# Patient Record
Sex: FEMALE | Race: WHITE | Employment: FULL TIME | URBAN - NONMETROPOLITAN AREA
[De-identification: names, ages, dates, MRNs, and addresses within clinical notes are randomized per-mention and may not be internally consistent; named-entity substitution may affect disease eponyms.]

---

## 2017-01-16 ENCOUNTER — OFFICE VISIT (OUTPATIENT)
Dept: PRIMARY CARE CLINIC | Age: 42
End: 2017-01-16
Payer: COMMERCIAL

## 2017-01-16 VITALS
OXYGEN SATURATION: 99 % | HEART RATE: 71 BPM | DIASTOLIC BLOOD PRESSURE: 70 MMHG | BODY MASS INDEX: 27.17 KG/M2 | HEIGHT: 60 IN | RESPIRATION RATE: 18 BRPM | WEIGHT: 138.4 LBS | TEMPERATURE: 98.3 F | SYSTOLIC BLOOD PRESSURE: 126 MMHG

## 2017-01-16 DIAGNOSIS — F41.9 ANXIETY AND DEPRESSION: ICD-10-CM

## 2017-01-16 DIAGNOSIS — Z71.3 ENCOUNTER FOR WEIGHT LOSS COUNSELING: ICD-10-CM

## 2017-01-16 DIAGNOSIS — Z11.4 SCREENING FOR HIV WITHOUT PRESENCE OF RISK FACTORS: ICD-10-CM

## 2017-01-16 DIAGNOSIS — F11.20 NARCOTIC DEPENDENCE (HCC): ICD-10-CM

## 2017-01-16 DIAGNOSIS — F32.A ANXIETY AND DEPRESSION: ICD-10-CM

## 2017-01-16 LAB
AMPHETAMINE SCREEN, URINE: NORMAL
BARBITURATE SCREEN, URINE: NORMAL
BENZODIAZEPINE SCREEN, URINE: NORMAL
COCAINE METABOLITE SCREEN URINE: NORMAL
MDMA URINE: NORMAL
METHADONE SCREEN, URINE: NORMAL
METHAMPHETAMINE, URINE: NORMAL
OPIATE SCREEN URINE: NORMAL
OXYCODONE SCREEN URINE: NORMAL
PHENCYCLIDINE SCREEN URINE: NORMAL
PROPOXYPHENE SCREEN, URINE: NORMAL
THC: NORMAL
TRICYCLIC ANTIDEPRESSANTS, UR: NORMAL

## 2017-01-16 PROCEDURE — 80305 DRUG TEST PRSMV DIR OPT OBS: CPT | Performed by: NURSE PRACTITIONER

## 2017-01-16 PROCEDURE — 99214 OFFICE O/P EST MOD 30 MIN: CPT | Performed by: NURSE PRACTITIONER

## 2017-01-16 RX ORDER — DESVENLAFAXINE 50 MG/1
100 TABLET, EXTENDED RELEASE ORAL DAILY
Qty: 90 TABLET | Refills: 2 | Status: SHIPPED | OUTPATIENT
Start: 2017-01-16 | End: 2017-04-24 | Stop reason: SDUPTHER

## 2017-01-16 RX ORDER — TRAZODONE HYDROCHLORIDE 50 MG/1
50 TABLET ORAL NIGHTLY
Qty: 90 TABLET | Refills: 0 | Status: SHIPPED | OUTPATIENT
Start: 2017-01-16 | End: 2017-04-24 | Stop reason: SDUPTHER

## 2017-01-16 RX ORDER — PHENTERMINE HYDROCHLORIDE 37.5 MG/1
TABLET ORAL
Qty: 30 TABLET | Refills: 0 | Status: SHIPPED | OUTPATIENT
Start: 2017-01-16 | End: 2017-04-26 | Stop reason: SDUPTHER

## 2017-01-16 ASSESSMENT — ENCOUNTER SYMPTOMS: RESPIRATORY NEGATIVE: 1

## 2017-01-17 DIAGNOSIS — Z11.4 SCREENING FOR HIV WITHOUT PRESENCE OF RISK FACTORS: ICD-10-CM

## 2017-01-17 LAB — RAPID HIV 1&2: NORMAL

## 2017-01-19 ENCOUNTER — TELEPHONE (OUTPATIENT)
Dept: PRIMARY CARE CLINIC | Age: 42
End: 2017-01-19

## 2017-03-28 ENCOUNTER — EMPLOYEE WELLNESS (OUTPATIENT)
Dept: OTHER | Age: 42
End: 2017-03-28

## 2017-03-28 LAB
CHOLESTEROL, TOTAL: 133 MG/DL (ref 160–199)
GLUCOSE BLD-MCNC: 87 MG/DL (ref 74–109)
HDLC SERPL-MCNC: 46 MG/DL (ref 65–121)
LDL CHOLESTEROL CALCULATED: 73 MG/DL
TRIGL SERPL-MCNC: 72 MG/DL (ref 150–199)

## 2017-04-03 ENCOUNTER — TELEPHONE (OUTPATIENT)
Dept: PRIMARY CARE CLINIC | Age: 42
End: 2017-04-03

## 2017-04-24 RX ORDER — DESVENLAFAXINE 50 MG/1
100 TABLET, EXTENDED RELEASE ORAL DAILY
Qty: 60 TABLET | Refills: 11 | Status: SHIPPED | OUTPATIENT
Start: 2017-04-24 | End: 2018-01-11 | Stop reason: SDUPTHER

## 2017-04-24 RX ORDER — TRAZODONE HYDROCHLORIDE 50 MG/1
50 TABLET ORAL NIGHTLY
Qty: 90 TABLET | Refills: 3 | Status: SHIPPED | OUTPATIENT
Start: 2017-04-24 | End: 2018-05-30 | Stop reason: SDUPTHER

## 2017-04-26 ENCOUNTER — TELEPHONE (OUTPATIENT)
Dept: PRIMARY CARE CLINIC | Age: 42
End: 2017-04-26

## 2017-04-26 DIAGNOSIS — Z76.0 MEDICATION REFILL: Primary | ICD-10-CM

## 2017-04-26 DIAGNOSIS — Z76.0 MEDICATION REFILL: ICD-10-CM

## 2017-04-26 DIAGNOSIS — Z71.3 ENCOUNTER FOR WEIGHT LOSS COUNSELING: Primary | ICD-10-CM

## 2017-04-26 RX ORDER — PHENTERMINE HYDROCHLORIDE 37.5 MG/1
TABLET ORAL
Qty: 30 TABLET | Refills: 0 | Status: SHIPPED | OUTPATIENT
Start: 2017-04-26 | End: 2017-04-26 | Stop reason: CLARIF

## 2017-05-22 ENCOUNTER — PATIENT MESSAGE (OUTPATIENT)
Dept: PRIMARY CARE CLINIC | Age: 42
End: 2017-05-22

## 2017-05-23 RX ORDER — PHENTERMINE HYDROCHLORIDE 37.5 MG/1
TABLET ORAL
Qty: 30 TABLET | Refills: 0 | OUTPATIENT
Start: 2017-05-23 | End: 2017-06-02 | Stop reason: SDUPTHER

## 2017-06-02 ENCOUNTER — OFFICE VISIT (OUTPATIENT)
Dept: PRIMARY CARE CLINIC | Age: 42
End: 2017-06-02
Payer: COMMERCIAL

## 2017-06-02 VITALS
WEIGHT: 135.4 LBS | DIASTOLIC BLOOD PRESSURE: 82 MMHG | SYSTOLIC BLOOD PRESSURE: 120 MMHG | OXYGEN SATURATION: 92 % | HEART RATE: 71 BPM | HEIGHT: 60 IN | BODY MASS INDEX: 26.58 KG/M2 | TEMPERATURE: 97.4 F

## 2017-06-02 DIAGNOSIS — Z71.3 ENCOUNTER FOR WEIGHT LOSS COUNSELING: Primary | ICD-10-CM

## 2017-06-02 DIAGNOSIS — Z76.0 MEDICATION REFILL: ICD-10-CM

## 2017-06-02 PROCEDURE — 99213 OFFICE O/P EST LOW 20 MIN: CPT | Performed by: NURSE PRACTITIONER

## 2017-06-02 RX ORDER — PHENTERMINE HYDROCHLORIDE 37.5 MG/1
TABLET ORAL
Qty: 30 TABLET | Refills: 0 | Status: SHIPPED | OUTPATIENT
Start: 2017-06-02 | End: 2017-07-02

## 2017-06-04 ASSESSMENT — ENCOUNTER SYMPTOMS
GASTROINTESTINAL NEGATIVE: 1
RESPIRATORY NEGATIVE: 1

## 2017-08-11 RX ORDER — PHENTERMINE HYDROCHLORIDE 37.5 MG/1
TABLET ORAL
Qty: 30 TABLET | Refills: 0 | OUTPATIENT
Start: 2017-08-11

## 2017-09-11 ENCOUNTER — PATIENT MESSAGE (OUTPATIENT)
Dept: PRIMARY CARE CLINIC | Age: 42
End: 2017-09-11

## 2017-09-12 DIAGNOSIS — Z12.31 SCREENING MAMMOGRAM, ENCOUNTER FOR: Primary | ICD-10-CM

## 2017-10-19 ENCOUNTER — HOSPITAL ENCOUNTER (OUTPATIENT)
Dept: WOMENS IMAGING | Age: 42
Discharge: HOME | End: 2017-10-19
Payer: COMMERCIAL

## 2017-10-19 DIAGNOSIS — Z12.31 SCREENING MAMMOGRAM, ENCOUNTER FOR: ICD-10-CM

## 2017-10-19 PROCEDURE — 77063 BREAST TOMOSYNTHESIS BI: CPT

## 2017-10-20 ENCOUNTER — HOSPITAL ENCOUNTER (OUTPATIENT)
Dept: WOMENS IMAGING | Age: 42
Discharge: HOME | End: 2017-10-20
Payer: COMMERCIAL

## 2017-10-20 ENCOUNTER — TELEPHONE (OUTPATIENT)
Dept: WOMENS IMAGING | Age: 42
End: 2017-10-20

## 2017-10-20 ENCOUNTER — HOSPITAL ENCOUNTER (OUTPATIENT)
Dept: ULTRASOUND IMAGING | Age: 42
End: 2017-10-20
Payer: COMMERCIAL

## 2017-10-20 DIAGNOSIS — N63.0 BREAST NODULE: ICD-10-CM

## 2017-10-20 PROCEDURE — G0279 TOMOSYNTHESIS, MAMMO: HCPCS

## 2017-10-20 NOTE — TELEPHONE ENCOUNTER
Contacted patient regarding abnormal screening mammogram results, she is scheduled for diagnostic imaging

## 2018-01-11 ENCOUNTER — OFFICE VISIT (OUTPATIENT)
Dept: PRIMARY CARE CLINIC | Age: 43
End: 2018-01-11
Payer: COMMERCIAL

## 2018-01-11 ENCOUNTER — LAB REQUISITION (OUTPATIENT)
Dept: LAB | Facility: HOSPITAL | Age: 43
End: 2018-01-11

## 2018-01-11 ENCOUNTER — HOSPITAL ENCOUNTER (OUTPATIENT)
Age: 43
Setting detail: SPECIMEN
Discharge: HOME | End: 2018-01-11
Payer: COMMERCIAL

## 2018-01-11 VITALS
HEIGHT: 60 IN | SYSTOLIC BLOOD PRESSURE: 120 MMHG | OXYGEN SATURATION: 99 % | TEMPERATURE: 98 F | BODY MASS INDEX: 27.29 KG/M2 | HEART RATE: 87 BPM | DIASTOLIC BLOOD PRESSURE: 82 MMHG | WEIGHT: 139 LBS

## 2018-01-11 DIAGNOSIS — Z71.6 TOBACCO ABUSE COUNSELING: ICD-10-CM

## 2018-01-11 DIAGNOSIS — Z71.6 ENCOUNTER FOR SMOKING CESSATION COUNSELING: ICD-10-CM

## 2018-01-11 DIAGNOSIS — Z71.3 ENCOUNTER FOR WEIGHT LOSS COUNSELING: Primary | ICD-10-CM

## 2018-01-11 DIAGNOSIS — Z72.0 TOBACCO ABUSE: ICD-10-CM

## 2018-01-11 DIAGNOSIS — Z01.419 ENCOUNTER FOR CERVICAL PAP SMEAR WITH PELVIC EXAM: ICD-10-CM

## 2018-01-11 DIAGNOSIS — Z01.419 ENCOUNTER FOR GYNECOLOGICAL EXAMINATION WITHOUT ABNORMAL FINDING: ICD-10-CM

## 2018-01-11 PROCEDURE — 87624 HPV HI-RISK TYP POOLED RSLT: CPT

## 2018-01-11 PROCEDURE — 99396 PREV VISIT EST AGE 40-64: CPT | Performed by: NURSE PRACTITIONER

## 2018-01-11 PROCEDURE — G0123 SCREEN CERV/VAG THIN LAYER: HCPCS | Performed by: NURSE PRACTITIONER

## 2018-01-11 PROCEDURE — 99406 BEHAV CHNG SMOKING 3-10 MIN: CPT | Performed by: NURSE PRACTITIONER

## 2018-01-11 PROCEDURE — 88142 CYTOPATH C/V THIN LAYER: CPT

## 2018-01-11 RX ORDER — DESVENLAFAXINE 50 MG/1
100 TABLET, EXTENDED RELEASE ORAL DAILY
Qty: 180 TABLET | Refills: 2 | Status: SHIPPED | OUTPATIENT
Start: 2018-01-11 | End: 2018-05-30 | Stop reason: SDUPTHER

## 2018-01-11 RX ORDER — BUPROPION HYDROCHLORIDE 75 MG/1
75 TABLET ORAL 2 TIMES DAILY
Qty: 60 TABLET | Refills: 3 | Status: SHIPPED | OUTPATIENT
Start: 2018-01-11 | End: 2018-02-08

## 2018-01-11 RX ORDER — PHENTERMINE HYDROCHLORIDE 37.5 MG/1
37.5 TABLET ORAL
Qty: 30 TABLET | Refills: 0 | Status: SHIPPED | OUTPATIENT
Start: 2018-01-11 | End: 2018-02-08

## 2018-01-11 ASSESSMENT — ENCOUNTER SYMPTOMS
GASTROINTESTINAL NEGATIVE: 1
EYES NEGATIVE: 1
RESPIRATORY NEGATIVE: 1

## 2018-01-11 NOTE — PROGRESS NOTES
History   Problem Relation Age of Onset    Diabetes Father     Cancer Maternal Aunt     Diabetes Paternal Grandmother          Subjective:      Review of Systems   Constitutional: Negative. Overweight   HENT: Negative. Eyes: Negative. Respiratory: Negative. Cardiovascular: Negative. Gastrointestinal: Negative. Endocrine: Negative. Genitourinary: Negative. Musculoskeletal: Negative. Skin: Negative. Neurological: Negative. Hematological: Negative. Psychiatric/Behavioral: Negative. Objective:     Physical Exam   Constitutional: She is oriented to person, place, and time. Vital signs are normal. She appears well-developed and well-nourished. HENT:   Head: Normocephalic and atraumatic. Right Ear: Hearing, tympanic membrane, external ear and ear canal normal.   Left Ear: Hearing, tympanic membrane, external ear and ear canal normal.   Nose: Nose normal.   Mouth/Throat: Uvula is midline, oropharynx is clear and moist and mucous membranes are normal.   Eyes: Conjunctivae, EOM and lids are normal. Pupils are equal, round, and reactive to light. Neck: Trachea normal and normal range of motion. Neck supple. No thyroid mass and no thyromegaly present. Cardiovascular: Normal rate, regular rhythm, normal heart sounds and normal pulses. Pulmonary/Chest: Effort normal and breath sounds normal. Right breast exhibits no inverted nipple, no mass and no tenderness. Left breast exhibits no inverted nipple, no mass and no tenderness. Abdominal: Soft. Normal appearance and bowel sounds are normal.   Genitourinary: Rectum normal, vagina normal and uterus normal. No breast swelling, tenderness, discharge or bleeding. Cervix exhibits no motion tenderness, no discharge and no friability. Musculoskeletal: Normal range of motion. Neurological: She is alert and oriented to person, place, and time. She has normal strength. Skin: Skin is warm, dry and intact.    Psychiatric: She

## 2018-01-16 LAB
GEN CATEG CVX/VAG CYTO-IMP: NORMAL
LAB AP CASE REPORT: NORMAL
LAB AP GYN ADDITIONAL INFORMATION: NORMAL
LAB AP GYN OTHER FINDINGS: NORMAL
Lab: NORMAL
PATH INTERP SPEC-IMP: NORMAL
STAT OF ADQ CVX/VAG CYTO-IMP: NORMAL

## 2018-01-17 ENCOUNTER — PATIENT MESSAGE (OUTPATIENT)
Dept: PRIMARY CARE CLINIC | Age: 43
End: 2018-01-17

## 2018-01-17 RX ORDER — VARENICLINE TARTRATE 25 MG
KIT ORAL
Qty: 1 EACH | Refills: 0 | Status: SHIPPED | OUTPATIENT
Start: 2018-01-17 | End: 2018-02-08

## 2018-01-18 LAB
HPV SOURCE: NORMAL
HPV, HIGH RISK: NEGATIVE

## 2018-02-08 ENCOUNTER — OFFICE VISIT (OUTPATIENT)
Dept: PRIMARY CARE CLINIC | Age: 43
End: 2018-02-08
Payer: COMMERCIAL

## 2018-02-08 VITALS
WEIGHT: 136 LBS | HEART RATE: 88 BPM | OXYGEN SATURATION: 99 % | SYSTOLIC BLOOD PRESSURE: 122 MMHG | BODY MASS INDEX: 26.7 KG/M2 | TEMPERATURE: 97.6 F | HEIGHT: 60 IN | DIASTOLIC BLOOD PRESSURE: 80 MMHG

## 2018-02-08 DIAGNOSIS — Z71.6 ENCOUNTER FOR TOBACCO USE CESSATION COUNSELING: ICD-10-CM

## 2018-02-08 DIAGNOSIS — Z72.0 TOBACCO ABUSE: ICD-10-CM

## 2018-02-08 DIAGNOSIS — Z71.3 ENCOUNTER FOR WEIGHT LOSS COUNSELING: Primary | ICD-10-CM

## 2018-02-08 DIAGNOSIS — E66.3 OVERWEIGHT (BMI 25.0-29.9): ICD-10-CM

## 2018-02-08 PROCEDURE — 99214 OFFICE O/P EST MOD 30 MIN: CPT | Performed by: NURSE PRACTITIONER

## 2018-02-08 RX ORDER — PHENTERMINE HYDROCHLORIDE 37.5 MG/1
37.5 TABLET ORAL
Qty: 30 TABLET | Refills: 0 | Status: SHIPPED | OUTPATIENT
Start: 2018-02-08 | End: 2018-03-10

## 2018-02-08 ASSESSMENT — PATIENT HEALTH QUESTIONNAIRE - PHQ9
SUM OF ALL RESPONSES TO PHQ QUESTIONS 1-9: 0
SUM OF ALL RESPONSES TO PHQ9 QUESTIONS 1 & 2: 0
1. LITTLE INTEREST OR PLEASURE IN DOING THINGS: 0
2. FEELING DOWN, DEPRESSED OR HOPELESS: 0

## 2018-02-08 NOTE — PROGRESS NOTES
[facility]  [facility]  [address] [place] [address]  Dept: [phone]  Dept Fax: [phone]  Loc: [phone]    Kashmir Pablo is a 39 y.o. female who presents today for her medical conditions/complaints as noted below. Kashmir Pablo is c/o of Weight Loss (follow up)      Chief Complaint   Patient presents with    Weight Loss     follow up       HPI:     HPI  Patient here for follow up on weight loss. She was started on phentermine at the last visit. She denies any side effects with the phentermine. She has also stopped smoking 1/28/2018. She is still doing the chantix at this time. Past Medical History:   Diagnosis Date    Shoulder fracture, right 2007        Past Surgical History:   Procedure Laterality Date    HYSTERECTOMY         Social History   Substance Use Topics    Smoking status: Former Smoker     Packs/day: 0.25     Quit date: 1/28/2018    Smokeless tobacco: Never Used    Alcohol use Yes      Comment: social         Current Outpatient Prescriptions   Medication Sig Dispense Refill    phentermine (ADIPEX-P) 37.5 MG tablet Take 1 tablet by mouth every morning (before breakfast) for 30 days.  30 tablet 0    desvenlafaxine succinate (PRISTIQ) 50 MG TB24 extended release tablet Take 2 tablets by mouth daily 180 tablet 2    traZODone (DESYREL) 50 MG tablet Take 1 tablet by mouth nightly 90 tablet 3    cyclobenzaprine (FLEXERIL) 10 MG tablet Take 10 mg by mouth 3 times daily as needed for Muscle spasms      gabapentin (NEURONTIN) 100 MG capsule Take 1 capsule by mouth daily 90 capsule 3     Current Facility-Administered Medications   Medication Dose Route Frequency Provider Last Rate Last Dose    methylPREDNISolone acetate (DEPO-MEDROL) injection 40 mg  40 mg Intramuscular Once [facility]           No Known Allergies    Family History   Problem Relation Age of Onset    Diabetes Father     Cancer Maternal Aunt     Diabetes Paternal Grandmother          Subjective:      Review of Systems   Constitutional: Negative. HENT: Negative. Eyes: Negative. Respiratory: Negative. Cardiovascular: Negative. Gastrointestinal: Negative. Endocrine: Negative. Genitourinary: Negative. Musculoskeletal: Negative. Skin: Negative. Neurological: Negative. Hematological: Negative. Psychiatric/Behavioral: Negative. Objective:     Physical Exam   Constitutional: She is oriented to person, place, and time. Vital signs are normal. She appears well-developed and well-nourished. HENT:   Head: Normocephalic and atraumatic. Right Ear: Hearing, tympanic membrane, external ear and ear canal normal.   Left Ear: Hearing, tympanic membrane, external ear and ear canal normal.   Nose: Nose normal.   Mouth/Throat: Uvula is midline, oropharynx is clear and moist and mucous membranes are normal.   Eyes: Conjunctivae, EOM and lids are normal. Pupils are equal, round, and reactive to light. Neck: Trachea normal and normal range of motion. Neck supple. No thyroid mass and no thyromegaly present. Cardiovascular: Normal rate, regular rhythm, normal heart sounds and normal pulses. Pulmonary/Chest: Effort normal and breath sounds normal.   Abdominal: Soft. Normal appearance and bowel sounds are normal.   Musculoskeletal: Normal range of motion. Cervical back: Normal. She exhibits normal range of motion and no tenderness. Thoracic back: Normal. She exhibits normal range of motion and no tenderness. Lumbar back: Normal. She exhibits normal range of motion and no tenderness. Neurological: She is alert and oriented to person, place, and time. She has normal strength. Skin: Skin is warm, dry and intact. Psychiatric: She has a normal mood and affect. Her speech is normal and behavior is normal. Judgment and thought content normal. Cognition and memory are normal.   Nursing note and vitals reviewed.       BP

## 2018-02-13 ASSESSMENT — ENCOUNTER SYMPTOMS
EYES NEGATIVE: 1
GASTROINTESTINAL NEGATIVE: 1
RESPIRATORY NEGATIVE: 1

## 2018-03-08 ENCOUNTER — PATIENT MESSAGE (OUTPATIENT)
Dept: PRIMARY CARE CLINIC | Age: 43
End: 2018-03-08

## 2018-03-20 VITALS — WEIGHT: 136 LBS | BODY MASS INDEX: 26.56 KG/M2

## 2018-03-23 ENCOUNTER — EMPLOYEE WELLNESS (OUTPATIENT)
Dept: OTHER | Age: 43
End: 2018-03-23

## 2018-03-23 LAB
CHOLESTEROL, TOTAL: 113 MG/DL (ref 160–199)
GLUCOSE BLD-MCNC: 87 MG/DL (ref 74–109)
HDLC SERPL-MCNC: 41 MG/DL (ref 65–121)
LDL CHOLESTEROL CALCULATED: 56 MG/DL
TRIGL SERPL-MCNC: 81 MG/DL (ref 0–149)

## 2018-04-02 ENCOUNTER — OFFICE VISIT (OUTPATIENT)
Dept: PRIMARY CARE CLINIC | Age: 43
End: 2018-04-02
Payer: COMMERCIAL

## 2018-04-02 VITALS
DIASTOLIC BLOOD PRESSURE: 70 MMHG | HEART RATE: 98 BPM | BODY MASS INDEX: 27.41 KG/M2 | TEMPERATURE: 98 F | OXYGEN SATURATION: 95 % | WEIGHT: 138 LBS | SYSTOLIC BLOOD PRESSURE: 118 MMHG

## 2018-04-02 VITALS — WEIGHT: 137 LBS | BODY MASS INDEX: 27.21 KG/M2

## 2018-04-02 DIAGNOSIS — F41.9 ANXIETY AND DEPRESSION: Primary | ICD-10-CM

## 2018-04-02 DIAGNOSIS — R04.0 EPISTAXIS: ICD-10-CM

## 2018-04-02 DIAGNOSIS — R53.83 FATIGUE, UNSPECIFIED TYPE: ICD-10-CM

## 2018-04-02 DIAGNOSIS — F32.A ANXIETY AND DEPRESSION: Primary | ICD-10-CM

## 2018-04-02 DIAGNOSIS — F32.A ANXIETY AND DEPRESSION: ICD-10-CM

## 2018-04-02 DIAGNOSIS — F41.9 ANXIETY AND DEPRESSION: ICD-10-CM

## 2018-04-02 LAB
ALBUMIN SERPL-MCNC: 4.3 G/DL (ref 3.5–5.2)
ALP BLD-CCNC: 69 U/L (ref 35–104)
ALT SERPL-CCNC: 11 U/L (ref 5–33)
ANION GAP SERPL CALCULATED.3IONS-SCNC: 16 MMOL/L (ref 7–19)
AST SERPL-CCNC: 14 U/L (ref 5–32)
BILIRUB SERPL-MCNC: <0.2 MG/DL (ref 0.2–1.2)
BILIRUBIN URINE: NEGATIVE
BLOOD, URINE: NEGATIVE
BUN BLDV-MCNC: 6 MG/DL (ref 6–20)
CALCIUM SERPL-MCNC: 9.4 MG/DL (ref 8.6–10)
CHLORIDE BLD-SCNC: 100 MMOL/L (ref 98–111)
CLARITY: CLEAR
CO2: 26 MMOL/L (ref 22–29)
COLOR: YELLOW
CREAT SERPL-MCNC: 0.5 MG/DL (ref 0.5–0.9)
FOLATE: 17.9 NG/ML (ref 4.8–37.3)
GFR NON-AFRICAN AMERICAN: >60
GLUCOSE BLD-MCNC: 84 MG/DL (ref 74–109)
GLUCOSE URINE: NEGATIVE MG/DL
HCT VFR BLD CALC: 38.5 % (ref 37–47)
HEMOGLOBIN: 12.1 G/DL (ref 12–16)
IRON: 19 UG/DL (ref 37–145)
KETONES, URINE: NEGATIVE MG/DL
LEUKOCYTE ESTERASE, URINE: NEGATIVE
MCH RBC QN AUTO: 24.9 PG (ref 27–31)
MCHC RBC AUTO-ENTMCNC: 31.4 G/DL (ref 33–37)
MCV RBC AUTO: 79.4 FL (ref 81–99)
NITRITE, URINE: NEGATIVE
PDW BLD-RTO: 15.1 % (ref 11.5–14.5)
PH UA: 7.5
PLATELET # BLD: 300 K/UL (ref 130–400)
PMV BLD AUTO: 11.7 FL (ref 9.4–12.3)
POTASSIUM SERPL-SCNC: 4.4 MMOL/L (ref 3.5–5)
PROTEIN PROTEIN: 4 MG/DL (ref 15–45)
PROTEIN UA: NEGATIVE MG/DL
RBC # BLD: 4.85 M/UL (ref 4.2–5.4)
SEDIMENTATION RATE, ERYTHROCYTE: 13 MM/HR (ref 0–20)
SODIUM BLD-SCNC: 142 MMOL/L (ref 136–145)
SPECIFIC GRAVITY UA: 1.01
TOTAL PROTEIN: 7.5 G/DL (ref 6.6–8.7)
TSH SERPL DL<=0.05 MIU/L-ACNC: 2.26 UIU/ML (ref 0.27–4.2)
UROBILINOGEN, URINE: 0.2 E.U./DL
VITAMIN B-12: 360 PG/ML (ref 211–946)
WBC # BLD: 8.6 K/UL (ref 4.8–10.8)

## 2018-04-02 PROCEDURE — 99214 OFFICE O/P EST MOD 30 MIN: CPT | Performed by: INTERNAL MEDICINE

## 2018-04-04 ENCOUNTER — OFFICE VISIT (OUTPATIENT)
Dept: OTOLARYNGOLOGY | Age: 43
End: 2018-04-04
Payer: COMMERCIAL

## 2018-04-04 VITALS
SYSTOLIC BLOOD PRESSURE: 120 MMHG | WEIGHT: 136 LBS | OXYGEN SATURATION: 99 % | TEMPERATURE: 98 F | HEIGHT: 60 IN | BODY MASS INDEX: 26.7 KG/M2 | HEART RATE: 76 BPM | DIASTOLIC BLOOD PRESSURE: 78 MMHG | RESPIRATION RATE: 18 BRPM

## 2018-04-04 DIAGNOSIS — R04.0 EPISTAXIS: Primary | ICD-10-CM

## 2018-04-04 PROCEDURE — 99203 OFFICE O/P NEW LOW 30 MIN: CPT | Performed by: OTOLARYNGOLOGY

## 2018-04-04 PROCEDURE — 30901 CONTROL OF NOSEBLEED: CPT | Performed by: OTOLARYNGOLOGY

## 2018-04-04 RX ORDER — PHENTERMINE HYDROCHLORIDE 37.5 MG/1
37.5 TABLET ORAL
Qty: 30 TABLET | Refills: 0 | Status: SHIPPED | OUTPATIENT
Start: 2018-04-04 | End: 2018-04-06 | Stop reason: SDUPTHER

## 2018-04-06 LAB
VITAMIN D2 AND D3, TOTAL: 21.3 NG/ML (ref 30–80)
VITAMIN D2, 25 HYDROXY: <1 NG/ML
VITAMIN D3,25 HYDROXY: 21.3 NG/ML

## 2018-04-06 RX ORDER — PHENTERMINE HYDROCHLORIDE 37.5 MG/1
37.5 TABLET ORAL
Qty: 30 TABLET | Refills: 0 | Status: SHIPPED | OUTPATIENT
Start: 2018-04-06 | End: 2019-01-04 | Stop reason: SDUPTHER

## 2018-04-11 ENCOUNTER — OFFICE VISIT (OUTPATIENT)
Dept: PRIMARY CARE CLINIC | Age: 43
End: 2018-04-11
Payer: COMMERCIAL

## 2018-04-11 VITALS
HEART RATE: 79 BPM | HEIGHT: 60 IN | BODY MASS INDEX: 27.68 KG/M2 | WEIGHT: 141 LBS | DIASTOLIC BLOOD PRESSURE: 72 MMHG | OXYGEN SATURATION: 99 % | SYSTOLIC BLOOD PRESSURE: 108 MMHG | TEMPERATURE: 97.7 F

## 2018-04-11 DIAGNOSIS — R63.5 WEIGHT GAIN: ICD-10-CM

## 2018-04-11 DIAGNOSIS — Z79.899 HIGH RISK MEDICATION USE: ICD-10-CM

## 2018-04-11 DIAGNOSIS — E55.9 VITAMIN D DEFICIENCY: Primary | ICD-10-CM

## 2018-04-11 DIAGNOSIS — E61.1 IRON DEFICIENCY: ICD-10-CM

## 2018-04-11 PROCEDURE — 99213 OFFICE O/P EST LOW 20 MIN: CPT | Performed by: NURSE PRACTITIONER

## 2018-04-11 PROCEDURE — 80305 DRUG TEST PRSMV DIR OPT OBS: CPT | Performed by: NURSE PRACTITIONER

## 2018-04-11 RX ORDER — LANOLIN ALCOHOL/MO/W.PET/CERES
325 CREAM (GRAM) TOPICAL 2 TIMES DAILY
Qty: 90 TABLET | Refills: 3 | Status: SHIPPED | OUTPATIENT
Start: 2018-04-11 | End: 2020-08-27

## 2018-04-11 ASSESSMENT — ENCOUNTER SYMPTOMS
WHEEZING: 0
APNEA: 0
SHORTNESS OF BREATH: 0

## 2018-04-13 ASSESSMENT — ENCOUNTER SYMPTOMS
CONSTIPATION: 0
BACK PAIN: 0
DIARRHEA: 0
VOMITING: 0
NAUSEA: 0
COUGH: 0
SHORTNESS OF BREATH: 0

## 2018-05-09 ENCOUNTER — PATIENT MESSAGE (OUTPATIENT)
Dept: PRIMARY CARE CLINIC | Age: 43
End: 2018-05-09

## 2018-05-10 RX ORDER — VARENICLINE TARTRATE 1 MG/1
1 TABLET, FILM COATED ORAL 2 TIMES DAILY
Qty: 60 TABLET | Refills: 3 | Status: SHIPPED | OUTPATIENT
Start: 2018-05-10 | End: 2018-08-14 | Stop reason: ALTCHOICE

## 2018-05-30 RX ORDER — TRAZODONE HYDROCHLORIDE 50 MG/1
50 TABLET ORAL NIGHTLY
Qty: 90 TABLET | Refills: 3 | Status: SHIPPED | OUTPATIENT
Start: 2018-05-30 | End: 2019-07-01 | Stop reason: SDUPTHER

## 2018-05-30 RX ORDER — DESVENLAFAXINE 50 MG/1
100 TABLET, EXTENDED RELEASE ORAL DAILY
Qty: 180 TABLET | Refills: 2 | Status: SHIPPED | OUTPATIENT
Start: 2018-05-30 | End: 2018-08-14 | Stop reason: ALTCHOICE

## 2018-08-14 ENCOUNTER — OFFICE VISIT (OUTPATIENT)
Dept: PRIMARY CARE CLINIC | Age: 43
End: 2018-08-14
Payer: COMMERCIAL

## 2018-08-14 VITALS
TEMPERATURE: 97.7 F | DIASTOLIC BLOOD PRESSURE: 60 MMHG | HEART RATE: 79 BPM | OXYGEN SATURATION: 99 % | WEIGHT: 141.2 LBS | HEIGHT: 60 IN | BODY MASS INDEX: 27.72 KG/M2 | SYSTOLIC BLOOD PRESSURE: 118 MMHG

## 2018-08-14 DIAGNOSIS — Z71.6 ENCOUNTER FOR SMOKING CESSATION COUNSELING: ICD-10-CM

## 2018-08-14 DIAGNOSIS — F41.9 ANXIETY: Primary | ICD-10-CM

## 2018-08-14 PROCEDURE — 99213 OFFICE O/P EST LOW 20 MIN: CPT | Performed by: NURSE PRACTITIONER

## 2018-08-14 PROCEDURE — 99406 BEHAV CHNG SMOKING 3-10 MIN: CPT | Performed by: NURSE PRACTITIONER

## 2018-08-14 RX ORDER — BUPROPION HYDROCHLORIDE 75 MG/1
75 TABLET ORAL 2 TIMES DAILY
Qty: 60 TABLET | Refills: 3 | Status: SHIPPED | OUTPATIENT
Start: 2018-08-14 | End: 2018-08-31

## 2018-08-14 RX ORDER — VARENICLINE TARTRATE 25 MG
KIT ORAL
Qty: 1 EACH | Refills: 0 | Status: SHIPPED | OUTPATIENT
Start: 2018-08-14 | End: 2018-10-25

## 2018-08-14 ASSESSMENT — ENCOUNTER SYMPTOMS
NAUSEA: 0
SHORTNESS OF BREATH: 0
COUGH: 0
ABDOMINAL PAIN: 0
DIARRHEA: 0
SINUS PRESSURE: 0
VOMITING: 0
WHEEZING: 0

## 2018-08-31 ENCOUNTER — OFFICE VISIT (OUTPATIENT)
Dept: PRIMARY CARE CLINIC | Age: 43
End: 2018-08-31
Payer: COMMERCIAL

## 2018-08-31 VITALS
SYSTOLIC BLOOD PRESSURE: 116 MMHG | HEIGHT: 60 IN | OXYGEN SATURATION: 98 % | TEMPERATURE: 97.6 F | HEART RATE: 74 BPM | BODY MASS INDEX: 27.76 KG/M2 | DIASTOLIC BLOOD PRESSURE: 64 MMHG | WEIGHT: 141.4 LBS

## 2018-08-31 DIAGNOSIS — R35.0 URINARY FREQUENCY: Primary | ICD-10-CM

## 2018-08-31 DIAGNOSIS — F41.9 ANXIETY: ICD-10-CM

## 2018-08-31 LAB
APPEARANCE FLUID: NORMAL
BILIRUBIN, POC: NORMAL
BLOOD URINE, POC: NORMAL
CLARITY, POC: CLEAR
COLOR, POC: YELLOW
GLUCOSE URINE, POC: NORMAL
KETONES, POC: NORMAL
LEUKOCYTE EST, POC: NORMAL
NITRITE, POC: NORMAL
PH, POC: 7.5
PROTEIN, POC: NORMAL
SPECIFIC GRAVITY, POC: 1.01
UROBILINOGEN, POC: 0.2

## 2018-08-31 PROCEDURE — 81002 URINALYSIS NONAUTO W/O SCOPE: CPT | Performed by: NURSE PRACTITIONER

## 2018-08-31 PROCEDURE — 99214 OFFICE O/P EST MOD 30 MIN: CPT | Performed by: NURSE PRACTITIONER

## 2018-08-31 RX ORDER — BUPROPION HYDROCHLORIDE 150 MG/1
150 TABLET, EXTENDED RELEASE ORAL 2 TIMES DAILY
Qty: 60 TABLET | Refills: 3 | Status: SHIPPED | OUTPATIENT
Start: 2018-08-31 | End: 2018-11-08 | Stop reason: ALTCHOICE

## 2018-08-31 RX ORDER — NITROFURANTOIN 25; 75 MG/1; MG/1
100 CAPSULE ORAL 2 TIMES DAILY
Qty: 20 CAPSULE | Refills: 0 | Status: SHIPPED | OUTPATIENT
Start: 2018-08-31 | End: 2018-09-10

## 2018-08-31 ASSESSMENT — ENCOUNTER SYMPTOMS
RESPIRATORY NEGATIVE: 1
GASTROINTESTINAL NEGATIVE: 1
EYES NEGATIVE: 1

## 2018-08-31 NOTE — PROGRESS NOTES
[facility]  [address] [facility]  [address] [facility] [address]  Dept: [phone]  Dept Fax: [phone]  Loc: [phone]    Navin Lara is a 39 y.o. female who presents today for her medical conditions/complaints as noted below. Navin Lara is c/o of Urinary Frequency (Possible UTI-jael pain) and Discuss Medications (issues with Wellbutrin )      Chief Complaint   Patient presents with    Urinary Frequency     Possible UTI-jael pain    Discuss Medications     issues with Wellbutrin        HPI:     HPI   Patient here with complaints of increased urinary frequency. She is questioning if she has a urinary tract infection. She states that symptoms has  Patient is also wanting to discuss medications. She was stopped on Pristiq at her last visit and was started on Wellbutrin. She has only been on this approximately one and half weeks. She states that the Pristiq was completed last week. She started Wellbutrin 75 mg BID this week. Past Medical History:   Diagnosis Date    Anxiety     Shoulder fracture, right 2007        Past Surgical History:   Procedure Laterality Date    HYSTERECTOMY         Social History   Substance Use Topics    Smoking status: Former Smoker     Packs/day: 0.25     Quit date: 1/28/2018    Smokeless tobacco: Never Used    Alcohol use Yes      Comment: social         Current Outpatient Prescriptions   Medication Sig Dispense Refill    nitrofurantoin, macrocrystal-monohydrate, (MACROBID) 100 MG capsule Take 1 capsule by mouth 2 times daily for 10 days 20 capsule 0    buPROPion (WELLBUTRIN SR) 150 MG extended release tablet Take 1 tablet by mouth 2 times daily 60 tablet 3    varenicline (CHANTIX STARTING MONTH PAK) 0.5 MG X 11 & 1 MG X 42 tablet Take by mouth.  1 each 0    traZODone (DESYREL) 50 MG tablet Take 1 tablet by mouth nightly 90 tablet 3    ferrous sulfate (FE TABS) 325 (65 Fe) MG EC tablet Take 1 tablet by mouth 2 times daily

## 2018-10-22 ENCOUNTER — HOSPITAL ENCOUNTER (OUTPATIENT)
Dept: WOMENS IMAGING | Age: 43
Discharge: HOME | End: 2018-10-22
Payer: COMMERCIAL

## 2018-10-22 DIAGNOSIS — Z12.39 BREAST CANCER SCREENING: ICD-10-CM

## 2018-10-22 PROCEDURE — 77063 BREAST TOMOSYNTHESIS BI: CPT

## 2018-10-25 ENCOUNTER — TELEPHONE (OUTPATIENT)
Dept: PRIMARY CARE CLINIC | Age: 43
End: 2018-10-25

## 2018-10-25 ENCOUNTER — OFFICE VISIT (OUTPATIENT)
Dept: PRIMARY CARE CLINIC | Age: 43
End: 2018-10-25
Payer: COMMERCIAL

## 2018-10-25 VITALS
WEIGHT: 146.2 LBS | TEMPERATURE: 97.8 F | DIASTOLIC BLOOD PRESSURE: 72 MMHG | SYSTOLIC BLOOD PRESSURE: 117 MMHG | HEART RATE: 73 BPM | HEIGHT: 65 IN | BODY MASS INDEX: 24.36 KG/M2 | OXYGEN SATURATION: 99 %

## 2018-10-25 DIAGNOSIS — F41.9 ANXIETY AND DEPRESSION: Primary | ICD-10-CM

## 2018-10-25 DIAGNOSIS — F32.A ANXIETY AND DEPRESSION: Primary | ICD-10-CM

## 2018-10-25 PROCEDURE — 99213 OFFICE O/P EST LOW 20 MIN: CPT | Performed by: NURSE PRACTITIONER

## 2018-10-25 RX ORDER — BUSPIRONE HYDROCHLORIDE 5 MG/1
5 TABLET ORAL 3 TIMES DAILY PRN
Qty: 90 TABLET | Refills: 0 | Status: SHIPPED | OUTPATIENT
Start: 2018-10-25 | End: 2018-11-08 | Stop reason: SDUPTHER

## 2018-11-08 ENCOUNTER — OFFICE VISIT (OUTPATIENT)
Dept: PRIMARY CARE CLINIC | Age: 43
End: 2018-11-08
Payer: COMMERCIAL

## 2018-11-08 VITALS
HEART RATE: 63 BPM | OXYGEN SATURATION: 99 % | TEMPERATURE: 97.6 F | HEIGHT: 60 IN | WEIGHT: 147 LBS | DIASTOLIC BLOOD PRESSURE: 72 MMHG | SYSTOLIC BLOOD PRESSURE: 128 MMHG | BODY MASS INDEX: 28.86 KG/M2

## 2018-11-08 DIAGNOSIS — F32.A ANXIETY AND DEPRESSION: Primary | ICD-10-CM

## 2018-11-08 DIAGNOSIS — F41.9 ANXIETY AND DEPRESSION: Primary | ICD-10-CM

## 2018-11-08 PROCEDURE — 99213 OFFICE O/P EST LOW 20 MIN: CPT | Performed by: NURSE PRACTITIONER

## 2018-11-08 RX ORDER — BUSPIRONE HYDROCHLORIDE 10 MG/1
10 TABLET ORAL 3 TIMES DAILY PRN
Qty: 90 TABLET | Refills: 3 | Status: SHIPPED | OUTPATIENT
Start: 2018-11-08 | End: 2019-02-11

## 2018-11-08 NOTE — PROGRESS NOTES
[address] [place] [address], [facility]  Phone:  [phone]  Fax:  [phone]      Indio Nielson is a 39 y.o. female who presents today for hermedical conditions/complaints as noted below. Indio Nielson is c/o of Anxiety (2wk FU) and Depression      Chief Complaint   Patient presents with    Anxiety     2wk FU    Depression       HPI:     HPI    Indio Nielson presents today for a 2 week follow up on anxiety and depression. She states she is doing better. She has successfully weaned off of her Wellbutrin. She is taking BuSpar 5mg one to two times per day and this reduces her anxiety. She states she wishes she only had to take this once per day. She does not wish to try any other medications that may make her have negative side effects like low libido and weight gain. She states the BuSpar has no negative side effects.      Past Medical History:   Diagnosis Date    Anxiety     Shoulder fracture, right 2007        Past Surgical History:   Procedure Laterality Date    HYSTERECTOMY         Social History   Substance Use Topics    Smoking status: Former Smoker     Packs/day: 0.25     Quit date: 1/28/2018    Smokeless tobacco: Never Used    Alcohol use Yes      Comment: social         Current Outpatient Prescriptions   Medication Sig Dispense Refill    busPIRone (BUSPAR) 10 MG tablet Take 1 tablet by mouth 3 times daily as needed (anxiety) 90 tablet 3    traZODone (DESYREL) 50 MG tablet Take 1 tablet by mouth nightly 90 tablet 3    ferrous sulfate (FE TABS) 325 (65 Fe) MG EC tablet Take 1 tablet by mouth 2 times daily 90 tablet 3    cyclobenzaprine (FLEXERIL) 10 MG tablet Take 10 mg by mouth 3 times daily as needed for Muscle spasms       Current Facility-Administered Medications   Medication Dose Route Frequency Provider Last Rate Last Dose    methylPREDNISolone acetate (DEPO-MEDROL) injection 40 mg  40 mg Intramuscular Once [facility]           No Known Allergies    Family History Problem Relation Age of Onset    Diabetes Father     Cancer Maternal Aunt     Diabetes Paternal Grandmother                Review of Systems   Constitutional: Negative for fatigue. Psychiatric/Behavioral: Negative for dysphoric mood. The patient is nervous/anxious. Objective:     Physical Exam   Constitutional: She is oriented to person, place, and time. She appears well-developed and well-nourished. HENT:   Head: Normocephalic and atraumatic. Eyes: Pupils are equal, round, and reactive to light. Neck: Normal range of motion. Cardiovascular: Normal rate, regular rhythm, normal heart sounds and intact distal pulses. Exam reveals no gallop and no friction rub. No murmur heard. Pulmonary/Chest: Effort normal and breath sounds normal. No respiratory distress. She has no wheezes. Abdominal: Soft. Bowel sounds are normal. She exhibits no distension. There is no tenderness. Musculoskeletal: Normal range of motion. Lumbar back: She exhibits normal range of motion. Lymphadenopathy:     She has no cervical adenopathy. Neurological: She is alert and oriented to person, place, and time. Skin: Skin is warm and dry. No rash noted. Psychiatric: She has a normal mood and affect. Her behavior is normal. Judgment and thought content normal.   Nursing note and vitals reviewed. /72   Pulse 63   Temp 97.6 °F (36.4 °C) (Temporal)   Ht 5' (1.524 m)   Wt 147 lb (66.7 kg)   SpO2 99%   BMI 28.71 kg/m²     Assessment:      Diagnosis Orders   1. Anxiety and depression  busPIRone (BUSPAR) 10 MG tablet       No results found for this visit on 11/08/18. Plan:     Weaned off Wellbutrin. Increase BuSpar to 10mg three times per day as needed. May only need once now. Return in about 6 months (around 5/8/2019), or if symptoms worsen or fail to improve, for Anxiety, physical.    No orders of the defined types were placed in this encounter.       Orders Placed This Encounter

## 2019-01-04 ENCOUNTER — OFFICE VISIT (OUTPATIENT)
Dept: PRIMARY CARE CLINIC | Age: 44
End: 2019-01-04
Payer: COMMERCIAL

## 2019-01-04 VITALS
HEART RATE: 61 BPM | OXYGEN SATURATION: 98 % | BODY MASS INDEX: 29.88 KG/M2 | DIASTOLIC BLOOD PRESSURE: 70 MMHG | WEIGHT: 152.2 LBS | SYSTOLIC BLOOD PRESSURE: 118 MMHG | HEIGHT: 60 IN | TEMPERATURE: 97.5 F

## 2019-01-04 DIAGNOSIS — Z76.89 ENCOUNTER FOR WEIGHT MANAGEMENT: ICD-10-CM

## 2019-01-04 DIAGNOSIS — R23.2 HOT FLASHES: ICD-10-CM

## 2019-01-04 DIAGNOSIS — F32.A ANXIETY AND DEPRESSION: Primary | ICD-10-CM

## 2019-01-04 DIAGNOSIS — F41.9 ANXIETY AND DEPRESSION: Primary | ICD-10-CM

## 2019-01-04 PROCEDURE — 99214 OFFICE O/P EST MOD 30 MIN: CPT | Performed by: NURSE PRACTITIONER

## 2019-01-04 RX ORDER — PHENTERMINE HYDROCHLORIDE 37.5 MG/1
37.5 TABLET ORAL
Qty: 30 TABLET | Refills: 0 | Status: SHIPPED | OUTPATIENT
Start: 2019-01-04 | End: 2019-02-11 | Stop reason: SDUPTHER

## 2019-01-04 RX ORDER — FLUOXETINE HYDROCHLORIDE 20 MG/1
20 CAPSULE ORAL DAILY
Qty: 30 CAPSULE | Refills: 1 | Status: SHIPPED | OUTPATIENT
Start: 2019-01-04 | End: 2019-02-11 | Stop reason: SDUPTHER

## 2019-01-04 ASSESSMENT — ENCOUNTER SYMPTOMS
SHORTNESS OF BREATH: 0
COUGH: 0
DIARRHEA: 0
NAUSEA: 0
ABDOMINAL PAIN: 0
VOMITING: 0
WHEEZING: 0

## 2019-02-11 ENCOUNTER — OFFICE VISIT (OUTPATIENT)
Dept: PRIMARY CARE CLINIC | Age: 44
End: 2019-02-11
Payer: COMMERCIAL

## 2019-02-11 VITALS
OXYGEN SATURATION: 98 % | DIASTOLIC BLOOD PRESSURE: 62 MMHG | HEART RATE: 70 BPM | BODY MASS INDEX: 28.43 KG/M2 | WEIGHT: 144.8 LBS | SYSTOLIC BLOOD PRESSURE: 118 MMHG | TEMPERATURE: 97.6 F | HEIGHT: 60 IN

## 2019-02-11 DIAGNOSIS — Z76.89 ENCOUNTER FOR WEIGHT MANAGEMENT: ICD-10-CM

## 2019-02-11 DIAGNOSIS — F41.9 ANXIETY AND DEPRESSION: Primary | ICD-10-CM

## 2019-02-11 DIAGNOSIS — F32.A ANXIETY AND DEPRESSION: Primary | ICD-10-CM

## 2019-02-11 PROCEDURE — 99213 OFFICE O/P EST LOW 20 MIN: CPT | Performed by: NURSE PRACTITIONER

## 2019-02-11 RX ORDER — PHENTERMINE HYDROCHLORIDE 37.5 MG/1
37.5 TABLET ORAL
Qty: 30 TABLET | Refills: 0 | Status: SHIPPED | OUTPATIENT
Start: 2019-02-11 | End: 2019-03-13

## 2019-02-11 RX ORDER — FLUOXETINE HYDROCHLORIDE 20 MG/1
20 CAPSULE ORAL DAILY
Qty: 90 CAPSULE | Refills: 3 | Status: SHIPPED | OUTPATIENT
Start: 2019-02-11 | End: 2019-06-10

## 2019-02-11 ASSESSMENT — PATIENT HEALTH QUESTIONNAIRE - PHQ9
SUM OF ALL RESPONSES TO PHQ QUESTIONS 1-9: 0
2. FEELING DOWN, DEPRESSED OR HOPELESS: 0
SUM OF ALL RESPONSES TO PHQ9 QUESTIONS 1 & 2: 0
1. LITTLE INTEREST OR PLEASURE IN DOING THINGS: 0
SUM OF ALL RESPONSES TO PHQ QUESTIONS 1-9: 0

## 2019-02-12 ASSESSMENT — ENCOUNTER SYMPTOMS
NAUSEA: 0
VOMITING: 0
BACK PAIN: 0
ABDOMINAL PAIN: 0
SHORTNESS OF BREATH: 0
DIARRHEA: 0
COUGH: 0
WHEEZING: 0

## 2019-03-12 ENCOUNTER — EMPLOYEE WELLNESS (OUTPATIENT)
Dept: OTHER | Age: 44
End: 2019-03-12

## 2019-03-12 LAB
CHOLESTEROL, TOTAL: 125 MG/DL (ref 160–199)
GLUCOSE BLD-MCNC: 90 MG/DL (ref 74–109)
HDLC SERPL-MCNC: 47 MG/DL (ref 65–121)
LDL CHOLESTEROL CALCULATED: 54 MG/DL
TRIGL SERPL-MCNC: 119 MG/DL (ref 0–149)

## 2019-03-20 VITALS — BODY MASS INDEX: 28.12 KG/M2 | WEIGHT: 144 LBS

## 2019-06-10 ENCOUNTER — OFFICE VISIT (OUTPATIENT)
Dept: INTERNAL MEDICINE | Age: 44
End: 2019-06-10
Payer: COMMERCIAL

## 2019-06-10 VITALS
HEIGHT: 60 IN | OXYGEN SATURATION: 98 % | BODY MASS INDEX: 30.63 KG/M2 | HEART RATE: 68 BPM | RESPIRATION RATE: 18 BRPM | DIASTOLIC BLOOD PRESSURE: 74 MMHG | WEIGHT: 156 LBS | SYSTOLIC BLOOD PRESSURE: 112 MMHG

## 2019-06-10 DIAGNOSIS — Z00.00 HEALTHCARE MAINTENANCE: ICD-10-CM

## 2019-06-10 DIAGNOSIS — R63.5 WEIGHT GAIN: ICD-10-CM

## 2019-06-10 DIAGNOSIS — E55.9 VITAMIN D DEFICIENCY: ICD-10-CM

## 2019-06-10 DIAGNOSIS — R53.83 OTHER FATIGUE: Primary | ICD-10-CM

## 2019-06-10 DIAGNOSIS — R53.83 OTHER FATIGUE: ICD-10-CM

## 2019-06-10 DIAGNOSIS — E04.1 THYROID NODULE: ICD-10-CM

## 2019-06-10 DIAGNOSIS — E61.1 IRON DEFICIENCY: ICD-10-CM

## 2019-06-10 DIAGNOSIS — F41.9 ANXIETY AND DEPRESSION: ICD-10-CM

## 2019-06-10 DIAGNOSIS — F32.A ANXIETY AND DEPRESSION: ICD-10-CM

## 2019-06-10 LAB
ALBUMIN SERPL-MCNC: 4.4 G/DL (ref 3.5–5.2)
ALP BLD-CCNC: 64 U/L (ref 35–104)
ALT SERPL-CCNC: 10 U/L (ref 5–33)
ANION GAP SERPL CALCULATED.3IONS-SCNC: 14 MMOL/L (ref 7–19)
AST SERPL-CCNC: 14 U/L (ref 5–32)
BASOPHILS ABSOLUTE: 0 K/UL (ref 0–0.2)
BASOPHILS RELATIVE PERCENT: 0.4 % (ref 0–1)
BILIRUB SERPL-MCNC: <0.2 MG/DL (ref 0.2–1.2)
BUN BLDV-MCNC: 12 MG/DL (ref 6–20)
CALCIUM SERPL-MCNC: 9.6 MG/DL (ref 8.6–10)
CHLORIDE BLD-SCNC: 103 MMOL/L (ref 98–111)
CO2: 27 MMOL/L (ref 22–29)
CREAT SERPL-MCNC: 0.8 MG/DL (ref 0.5–0.9)
EOSINOPHILS ABSOLUTE: 0.1 K/UL (ref 0–0.6)
EOSINOPHILS RELATIVE PERCENT: 1.1 % (ref 0–5)
GFR NON-AFRICAN AMERICAN: >60
GLUCOSE BLD-MCNC: 83 MG/DL (ref 74–109)
HCT VFR BLD CALC: 42.1 % (ref 37–47)
HEMOGLOBIN: 13.4 G/DL (ref 12–16)
IRON: 62 UG/DL (ref 37–145)
LYMPHOCYTES ABSOLUTE: 2.4 K/UL (ref 1.1–4.5)
LYMPHOCYTES RELATIVE PERCENT: 25.1 % (ref 20–40)
MCH RBC QN AUTO: 26.9 PG (ref 27–31)
MCHC RBC AUTO-ENTMCNC: 31.8 G/DL (ref 33–37)
MCV RBC AUTO: 84.5 FL (ref 81–99)
MONOCYTES ABSOLUTE: 0.9 K/UL (ref 0–0.9)
MONOCYTES RELATIVE PERCENT: 9.1 % (ref 0–10)
NEUTROPHILS ABSOLUTE: 6.1 K/UL (ref 1.5–7.5)
NEUTROPHILS RELATIVE PERCENT: 64 % (ref 50–65)
PDW BLD-RTO: 14.6 % (ref 11.5–14.5)
PLATELET # BLD: 197 K/UL (ref 130–400)
PMV BLD AUTO: 12.6 FL (ref 9.4–12.3)
POTASSIUM SERPL-SCNC: 4.1 MMOL/L (ref 3.5–5)
RBC # BLD: 4.98 M/UL (ref 4.2–5.4)
SODIUM BLD-SCNC: 144 MMOL/L (ref 136–145)
TOTAL PROTEIN: 7.2 G/DL (ref 6.6–8.7)
TSH SERPL DL<=0.05 MIU/L-ACNC: 2.35 UIU/ML (ref 0.27–4.2)
VITAMIN D 25-HYDROXY: 28.6 NG/ML
WBC # BLD: 9.6 K/UL (ref 4.8–10.8)

## 2019-06-10 PROCEDURE — 99204 OFFICE O/P NEW MOD 45 MIN: CPT | Performed by: NURSE PRACTITIONER

## 2019-06-10 RX ORDER — PAROXETINE HYDROCHLORIDE 20 MG/1
20 TABLET, FILM COATED ORAL DAILY
Qty: 30 TABLET | Refills: 3 | Status: SHIPPED | OUTPATIENT
Start: 2019-06-10 | End: 2019-08-06

## 2019-06-10 ASSESSMENT — ENCOUNTER SYMPTOMS
ABDOMINAL PAIN: 0
VOMITING: 0
TROUBLE SWALLOWING: 0
NAUSEA: 0
BLOOD IN STOOL: 0
EYE ITCHING: 0
STRIDOR: 0
CHOKING: 0
SORE THROAT: 0
ABDOMINAL DISTENTION: 0
COUGH: 0
COLOR CHANGE: 0
WHEEZING: 0
CONSTIPATION: 0
DIARRHEA: 0
SHORTNESS OF BREATH: 0
EYE DISCHARGE: 0

## 2019-06-10 NOTE — PROGRESS NOTES
[facility]  [address] [facility]  [address] [place] [address]  Dept: [phone]  Dept Fax: [phone]: [phone]    Sravanthi Andino is a 38 y.o. female who presents today for her medical conditions/complaints as noted below. Sravanthi Andino is c/rigoberto New Patient (Patient is here to establish care. Patient states she is having problems with Prozac and would like to discuss different medication.)        HPI:     HPI   1. Depression; she is on prozac now; She has tried wellbutryn as she was trying to stop    She has seen several providers at [facility] upstairs; She wanted to see jsut one person    2. Anxiety She had been given buspar for anxiety ;    3.  Weight gain; She feels these meds have had   4. Health maintenance; She has not been having pap smears in the last 6 years ; She is agreeable to do this next visit   She does get her mammograms routinely   5. Iron def  ;she has had this in the past and was put on iron but she stopped taking it because of constipation    6. Vit d def; she was never given script for this      Chief Complaint   Patient presents with    New Patient     Patient is here to establish care. Patient states she is having problems with Prozac and would like to discuss different medication.        Past Medical History:   Diagnosis Date    Anxiety     Shoulder fracture, right 2007      Past Surgical History:   Procedure Laterality Date    HYSTERECTOMY         Vitals 6/10/2019 3/12/2019 2/11/2019 1/4/2019 11/8/2018 94/66/6168   SYSTOLIC 933 - 660 509 208 972   DIASTOLIC 74 - 62 70 72 72   Site - - - - - -   Position - - - - - -   Cuff Size - - - - - -   Pulse 68 - 70 61 63 73   Temp - - 97.6 97.5 97.6 97.8   Resp 18 - - - - -   SpO2 98 - 98 98 99 99   Weight 156 lb 144 lb 144 lb 12.8 oz 152 lb 3.2 oz 147 lb 146 lb 3.2 oz   Height 5' 0\" - 5' 0\" 4' 11.5\" 5' 0\" 5' 5\"   BMI (wt*703/ht~2) 30.46 kg/m2 - 28.28 kg/m2 30.22 kg/m2 28.71 kg/m2 24.33 kg/m2   Waist (Inches) - 33 - - - -   Some recent data might be hidden       Family History   Problem Relation Age of Onset    Diabetes Father     Cancer Maternal Aunt     Diabetes Paternal Grandmother        Social History     Tobacco Use    Smoking status: Former Smoker     Packs/day: 0.25     Last attempt to quit: 2018     Years since quittin.3    Smokeless tobacco: Never Used   Substance Use Topics    Alcohol use: Yes     Comment: social       Current Outpatient Medications   Medication Sig Dispense Refill    PARoxetine (PAXIL) 20 MG tablet Take 1 tablet by mouth daily 30 tablet 3    traZODone (DESYREL) 50 MG tablet Take 1 tablet by mouth nightly 90 tablet 3    cyclobenzaprine (FLEXERIL) 10 MG tablet Take 10 mg by mouth 3 times daily as needed for Muscle spasms      ferrous sulfate (FE TABS) 325 (65 Fe) MG EC tablet Take 1 tablet by mouth 2 times daily 90 tablet 3     Current Facility-Administered Medications   Medication Dose Route Frequency Provider Last Rate Last Dose    methylPREDNISolone acetate (DEPO-MEDROL) injection 40 mg  40 mg Intramuscular Once Deirdre Situ, APRN         No Known Allergies    Health Maintenance   Topic Date Due    Lipid screen  2024    DTaP/Tdap/Td vaccine (2 - Td) 2025    Flu vaccine  Completed    HIV screen  Completed    Pneumococcal 0-64 years Vaccine  Aged Out       Lab Results   Component Value Date    LABA1C 5.1 2016     No results found for: PSA, PSADIA  TSH   Date Value Ref Range Status   2018 2.260 0.270 - 4.200 uIU/mL Final   ]  Lab Results   Component Value Date     2018    K 4.4 2018     2018    CO2 26 2018    BUN 6 2018    CREATININE 0.5 2018    GLUCOSE 90 2019    CALCIUM 9.4 2018    PROT 7.5 2018    LABALBU 4.3 2018    BILITOT <0.2 2018    ALKPHOS 69 2018    AST 14 2018    ALT 11 2018    LABGLOM >60 2018     Lab Results   Component Value Date    CHOL 125 (L) 03/12/2019    CHOL 113 (L) 03/23/2018    CHOL 133 (L) 03/28/2017     Lab Results   Component Value Date    TRIG 119 03/12/2019    TRIG 81 03/23/2018    TRIG 72 (L) 03/28/2017     Lab Results   Component Value Date    HDL 47 (L) 03/12/2019    HDL 41 (L) 03/23/2018    HDL 46 (L) 03/28/2017     Lab Results   Component Value Date    LDLCALC 54 03/12/2019    LDLCALC 56 03/23/2018    LDLCALC 73 03/28/2017     Lab Results   Component Value Date     04/02/2018    K 4.4 04/02/2018     04/02/2018    CO2 26 04/02/2018    BUN 6 04/02/2018    CREATININE 0.5 04/02/2018    GLUCOSE 90 03/12/2019    CALCIUM 9.4 04/02/2018      Lab Results   Component Value Date    WBC 8.6 04/02/2018    HGB 12.1 04/02/2018    HCT 38.5 04/02/2018    MCV 79.4 (L) 04/02/2018     04/02/2018    RBC 4.85 04/02/2018    MCH 24.9 (L) 04/02/2018    MCHC 31.4 (L) 04/02/2018    RDW 15.1 (H) 04/02/2018     No results found for: VITD25    Subjective:      Review of Systems   Constitutional: Positive for fatigue and unexpected weight change. Negative for fever. HENT: Negative for ear discharge, ear pain, mouth sores, sore throat and trouble swallowing. Eyes: Negative for discharge, itching and visual disturbance. Respiratory: Negative for cough, choking, shortness of breath, wheezing and stridor. Cardiovascular: Negative for chest pain, palpitations and leg swelling. Gastrointestinal: Negative for abdominal distention, abdominal pain, blood in stool, constipation, diarrhea, nausea and vomiting. Endocrine: Negative for cold intolerance, polydipsia and polyuria. Genitourinary: Negative for difficulty urinating, dysuria, frequency and urgency. Musculoskeletal: Negative for arthralgias and gait problem. Skin: Negative for color change and rash. Allergic/Immunologic: Negative for food allergies and immunocompromised state.    Neurological: Negative for dizziness, tremors, syncope, speech difficulty, weakness and headaches. Hematological: Negative for adenopathy. Does not bruise/bleed easily. Psychiatric/Behavioral: Negative for confusion and hallucinations. The patient is nervous/anxious. Depression       Objective:     Physical Exam   Constitutional: She is oriented to person, place, and time. She appears well-developed and well-nourished. No distress. HENT:   Head: Normocephalic and atraumatic. Eyes: Pupils are equal, round, and reactive to light. Right eye exhibits no discharge. Left eye exhibits no discharge. No scleral icterus. Neck: Normal range of motion. Neck supple. No JVD present. No thyromegaly (question of thyroid nodule) present. Cardiovascular: Normal rate, regular rhythm and normal heart sounds. No murmur heard. Pulmonary/Chest: Effort normal and breath sounds normal. No respiratory distress. She has no wheezes. She has no rales. Abdominal: Soft. Bowel sounds are normal. She exhibits no distension and no mass. There is no tenderness. There is no rebound and no guarding. Musculoskeletal: Normal range of motion. She exhibits no edema or tenderness. Neurological: She is alert and oriented to person, place, and time. She has normal reflexes. She displays normal reflexes. No cranial nerve deficit. Coordination normal.   Skin: Skin is warm and dry. No rash noted. No erythema. Psychiatric: Her behavior is normal. Judgment and thought content normal. She does not exhibit a depressed mood. /74   Pulse 68   Resp 18   Ht 5' (1.524 m)   Wt 156 lb (70.8 kg)   SpO2 98%   BMI 30.47 kg/m²     Assessment:       Diagnosis Orders   1. Other fatigue  Vitamin D 25 Hydroxy    Iron    CBC Auto Differential    Comprehensive Metabolic Panel    TSH without Reflex   2. Thyroid nodule  US HEAD NECK SOFT TISSUE THYROID   3. Anxiety and depression     4. Weight gain     5. Iron deficiency     6. Healthcare maintenance     7.  Vitamin D deficiency       Labs

## 2019-06-11 RX ORDER — ERGOCALCIFEROL 1.25 MG/1
50000 CAPSULE ORAL WEEKLY
Qty: 30 CAPSULE | Refills: 3 | Status: SHIPPED | OUTPATIENT
Start: 2019-06-11 | End: 2020-08-27 | Stop reason: SDUPTHER

## 2019-06-14 ENCOUNTER — HOSPITAL ENCOUNTER (OUTPATIENT)
Dept: ULTRASOUND IMAGING | Age: 44
Discharge: HOME | End: 2019-06-14
Payer: COMMERCIAL

## 2019-06-14 DIAGNOSIS — E04.1 THYROID NODULE: ICD-10-CM

## 2019-06-14 PROCEDURE — 76536 US EXAM OF HEAD AND NECK: CPT

## 2019-06-19 ENCOUNTER — OFFICE VISIT (OUTPATIENT)
Dept: OTOLARYNGOLOGY | Age: 44
End: 2019-06-19
Payer: COMMERCIAL

## 2019-06-19 VITALS
OXYGEN SATURATION: 99 % | RESPIRATION RATE: 18 BRPM | WEIGHT: 156 LBS | SYSTOLIC BLOOD PRESSURE: 108 MMHG | DIASTOLIC BLOOD PRESSURE: 64 MMHG | HEART RATE: 67 BPM | BODY MASS INDEX: 30.63 KG/M2 | TEMPERATURE: 97.8 F | HEIGHT: 60 IN

## 2019-06-19 DIAGNOSIS — E04.1 RIGHT THYROID NODULE: Primary | ICD-10-CM

## 2019-06-19 PROCEDURE — 99214 OFFICE O/P EST MOD 30 MIN: CPT | Performed by: OTOLARYNGOLOGY

## 2019-06-19 NOTE — PROGRESS NOTES
enlarging solid nodule may   be biopsied. Signed by Dr Alber Garduno on 6/14/2019 8:28 AM       A 12 point review of systems was completed, reviewed, and scanned to chart per staff. Patient medical history reviewed. Objective:     Physical Exam  /64   Pulse 67   Temp 97.8 °F (36.6 °C)   Resp 18   Ht 5' (1.524 m)   Wt 156 lb (70.8 kg)   SpO2 99%   BMI 30.47 kg/m²   Physical Exam:     General appearance: Normally developed structures of the head and neck with no deformities. Ability to communicate: Normal voice with ability to convey appropriately the nature of their complaints. Head and Face: Normal appearance with no visible lesions of the skin. Sinus tenderness: None appreciated on exam. No areas of facial swelling. Facial strength: No weakness of the facial muscles appreciated. Otoscopic: Normal external ear canals and tympanic membranes. Hearing assessment: normal to soft voice and finger rub. External ears and nose: normal.   Nasal exam: Anterior speculum exam normal with no polyps purulence or lesions. Oral:  Mucosa of buccal, floor of mouth, and palatal areas appear normal and free of any lesions. Lips, teeth, gingiva: Normal with no lesions. Oropharynx: Tongue reveals normal appearance and mobility. Oral mucosa of buccal, floor of mouth, and palatal areas appear normal and free of any lesions or ulcerations. Salivary:  Examination of the parotid and submandibular glands was normal with no palpable masses, nodules or irregularities. Neck: No gross swellings or deformities. No palpable lymphadenopathy. Thyroid normal without palpable masses. Respiratory:  Effort appears normal with no notable distress, stridor,accessory muscle usage or tachypnea         Assessment:      Diagnosis Orders   1. Right thyroid nodule             Plan:     Thyroid nodule management options.   Prior ultrasound exam(s) compared and management parameters explained and range of management

## 2019-07-01 RX ORDER — TRAZODONE HYDROCHLORIDE 50 MG/1
50 TABLET ORAL NIGHTLY
Qty: 90 TABLET | Refills: 3 | Status: SHIPPED | OUTPATIENT
Start: 2019-07-01 | End: 2020-07-28

## 2019-07-02 ENCOUNTER — OFFICE VISIT (OUTPATIENT)
Dept: INTERNAL MEDICINE | Age: 44
End: 2019-07-02
Payer: COMMERCIAL

## 2019-07-02 VITALS
SYSTOLIC BLOOD PRESSURE: 122 MMHG | OXYGEN SATURATION: 97 % | BODY MASS INDEX: 30.82 KG/M2 | HEIGHT: 60 IN | RESPIRATION RATE: 18 BRPM | HEART RATE: 68 BPM | WEIGHT: 157 LBS | DIASTOLIC BLOOD PRESSURE: 76 MMHG

## 2019-07-02 DIAGNOSIS — E66.09 CLASS 1 OBESITY DUE TO EXCESS CALORIES WITHOUT SERIOUS COMORBIDITY WITH BODY MASS INDEX (BMI) OF 30.0 TO 30.9 IN ADULT: Primary | ICD-10-CM

## 2019-07-02 DIAGNOSIS — F32.A ANXIETY AND DEPRESSION: ICD-10-CM

## 2019-07-02 DIAGNOSIS — F41.9 ANXIETY AND DEPRESSION: ICD-10-CM

## 2019-07-02 DIAGNOSIS — E04.1 THYROID NODULE: ICD-10-CM

## 2019-07-02 PROBLEM — E66.9 CLASS 1 OBESITY IN ADULT: Status: ACTIVE | Noted: 2019-07-02

## 2019-07-02 PROCEDURE — 99213 OFFICE O/P EST LOW 20 MIN: CPT | Performed by: NURSE PRACTITIONER

## 2019-07-02 ASSESSMENT — ENCOUNTER SYMPTOMS
CHOKING: 0
ABDOMINAL PAIN: 0
SHORTNESS OF BREATH: 0
CONSTIPATION: 0
SORE THROAT: 0
EYE ITCHING: 0
DIARRHEA: 0
EYE DISCHARGE: 0
STRIDOR: 0
TROUBLE SWALLOWING: 0
WHEEZING: 0
NAUSEA: 0
BLOOD IN STOOL: 0
ABDOMINAL DISTENTION: 0
VOMITING: 0
COLOR CHANGE: 0
COUGH: 0

## 2019-07-02 NOTE — PROGRESS NOTES
HDL 47 (L) 03/12/2019    HDL 41 (L) 03/23/2018    HDL 46 (L) 03/28/2017     Lab Results   Component Value Date    LDLCALC 54 03/12/2019    LDLCALC 56 03/23/2018    LDLCALC 73 03/28/2017     Lab Results   Component Value Date     06/10/2019    K 4.1 06/10/2019     06/10/2019    CO2 27 06/10/2019    BUN 12 06/10/2019    CREATININE 0.8 06/10/2019    GLUCOSE 83 06/10/2019    CALCIUM 9.6 06/10/2019      Lab Results   Component Value Date    WBC 9.6 06/10/2019    HGB 13.4 06/10/2019    HCT 42.1 06/10/2019    MCV 84.5 06/10/2019     06/10/2019    LYMPHOPCT 25.1 06/10/2019    RBC 4.98 06/10/2019    MCH 26.9 (L) 06/10/2019    MCHC 31.8 (L) 06/10/2019    RDW 14.6 (H) 06/10/2019     Lab Results   Component Value Date    VITD25 28.6 (L) 06/10/2019       Subjective:      Review of Systems   Constitutional: Negative for fatigue, fever and unexpected weight change. HENT: Negative for ear discharge, ear pain, mouth sores, sore throat and trouble swallowing. Eyes: Negative for discharge, itching and visual disturbance. Respiratory: Negative for cough, choking, shortness of breath, wheezing and stridor. Cardiovascular: Negative for chest pain, palpitations and leg swelling. Gastrointestinal: Negative for abdominal distention, abdominal pain, blood in stool, constipation, diarrhea, nausea and vomiting. Endocrine: Negative for cold intolerance, polydipsia and polyuria. Genitourinary: Negative for difficulty urinating, dysuria, frequency and urgency. Musculoskeletal: Negative for arthralgias and gait problem. Skin: Negative for color change and rash. Allergic/Immunologic: Negative for food allergies and immunocompromised state. Neurological: Negative for dizziness, tremors, syncope, speech difficulty, weakness and headaches. Hematological: Negative for adenopathy. Does not bruise/bleed easily. Psychiatric/Behavioral: Negative for confusion and hallucinations.        Objective:     Physical Medications    Phentermine HCl 37.5 MG TBDP     Sig: Take 1 tablet by mouth daily for 30 days. Dispense:  30 tablet     Refill:  0         ORDERS:  No orders of the defined types were placed in this encounter. Follow-up:  Return in about 1 month (around 7/30/2019). PATIENT INSTRUCTIONS:  Patient Instructions   1 weight gain we will try 37.5 of phentermine. He will need to return monthly for blood pressure checks etc.  2.  Thyroid nodule continue follow-up with Dr. Tiffanie Griffin  3. Anxiety depression improved with the Paxil no changes are necessary    Electronically signed by FCO Altamirano on 7/2/2019 at 11:52 AM    EMRDragon/transcription disclaimer:  Much of this encounter note is electronic transcription/translation of spoken language to printed texts. The electronic translation of spoken language may be erroneous, or at times,nonsensical words or phrases may be inadvertently transcribed.   Although I have reviewed the note for such errors, some may still exist.

## 2019-08-06 ENCOUNTER — OFFICE VISIT (OUTPATIENT)
Dept: INTERNAL MEDICINE | Age: 44
End: 2019-08-06
Payer: COMMERCIAL

## 2019-08-06 VITALS
HEIGHT: 60 IN | SYSTOLIC BLOOD PRESSURE: 124 MMHG | OXYGEN SATURATION: 98 % | HEART RATE: 82 BPM | RESPIRATION RATE: 18 BRPM | WEIGHT: 155 LBS | BODY MASS INDEX: 30.43 KG/M2 | DIASTOLIC BLOOD PRESSURE: 68 MMHG

## 2019-08-06 DIAGNOSIS — F41.9 ANXIETY AND DEPRESSION: ICD-10-CM

## 2019-08-06 DIAGNOSIS — Z00.00 HEALTHCARE MAINTENANCE: ICD-10-CM

## 2019-08-06 DIAGNOSIS — E66.09 CLASS 1 OBESITY DUE TO EXCESS CALORIES WITHOUT SERIOUS COMORBIDITY WITH BODY MASS INDEX (BMI) OF 30.0 TO 30.9 IN ADULT: Primary | ICD-10-CM

## 2019-08-06 DIAGNOSIS — F32.A ANXIETY AND DEPRESSION: ICD-10-CM

## 2019-08-06 PROCEDURE — 99213 OFFICE O/P EST LOW 20 MIN: CPT | Performed by: NURSE PRACTITIONER

## 2019-08-06 RX ORDER — PAROXETINE 30 MG/1
30 TABLET, FILM COATED ORAL DAILY
Qty: 30 TABLET | Refills: 2 | Status: SHIPPED | OUTPATIENT
Start: 2019-08-06 | End: 2020-01-07

## 2019-08-06 ASSESSMENT — ENCOUNTER SYMPTOMS
EYE ITCHING: 0
COLOR CHANGE: 0
SORE THROAT: 0
SHORTNESS OF BREATH: 0
CHOKING: 0
TROUBLE SWALLOWING: 0
VOMITING: 0
WHEEZING: 0
NAUSEA: 0
ABDOMINAL PAIN: 0
EYE DISCHARGE: 0
STRIDOR: 0
CONSTIPATION: 0
COUGH: 0
BLOOD IN STOOL: 0
ABDOMINAL DISTENTION: 0
DIARRHEA: 0

## 2019-08-06 NOTE — PROGRESS NOTES
atraumatic. Eyes: Pupils are equal, round, and reactive to light. Right eye exhibits no discharge. Left eye exhibits no discharge. No scleral icterus. Neck: Normal range of motion. Neck supple. No JVD present. No thyromegaly present. Cardiovascular:   No murmur heard. Pulmonary/Chest: Effort normal. No respiratory distress. Abdominal: She exhibits no distension and no mass. There is no tenderness. There is no rebound and no guarding. Musculoskeletal: Normal range of motion. She exhibits no edema or tenderness. Neurological: She is alert and oriented to person, place, and time. She has normal reflexes. She displays normal reflexes. No cranial nerve deficit. Coordination normal.   Skin: Skin is warm and dry. No rash noted. No erythema. Psychiatric: Her behavior is normal. Judgment and thought content normal. She does not exhibit a depressed mood. /68   Pulse 82   Resp 18   Ht 5' (1.524 m)   Wt 155 lb (70.3 kg)   SpO2 98%   BMI 30.27 kg/m²     Assessment:       Diagnosis Orders   1. Class 1 obesity due to excess calories without serious comorbidity with body mass index (BMI) of 30.0 to 30.9 in adult     2. Anxiety and depression         Plan:        Patient given educational materials - see patient instructions. Discussed use, benefit, and side effects of prescribed medications. Allpatient questions answered. Pt voiced understanding. Reviewed health maintenance. Instructed to continue current medications, diet and exercise. Patient agreed with treatment plan. Follow up as directed. MEDICATIONS:  Orders Placed This Encounter   Medications    PARoxetine (PAXIL) 30 MG tablet     Sig: Take 1 tablet by mouth daily     Dispense:  30 tablet     Refill:  2         ORDERS:  No orders of the defined types were placed in this encounter. Follow-up:  No follow-ups on file. PATIENT INSTRUCTIONS:  Patient Instructions   1. Anxiety; Increase paxil to 30 mg daily;     We will try that for

## 2019-09-10 ENCOUNTER — OFFICE VISIT (OUTPATIENT)
Dept: INTERNAL MEDICINE | Age: 44
End: 2019-09-10
Payer: COMMERCIAL

## 2019-09-10 VITALS
OXYGEN SATURATION: 97 % | HEIGHT: 60 IN | BODY MASS INDEX: 31.41 KG/M2 | RESPIRATION RATE: 18 BRPM | DIASTOLIC BLOOD PRESSURE: 72 MMHG | WEIGHT: 160 LBS | HEART RATE: 65 BPM | SYSTOLIC BLOOD PRESSURE: 116 MMHG

## 2019-09-10 DIAGNOSIS — R35.0 URINARY FREQUENCY: ICD-10-CM

## 2019-09-10 DIAGNOSIS — N39.0 URINARY TRACT INFECTION WITHOUT HEMATURIA, SITE UNSPECIFIED: ICD-10-CM

## 2019-09-10 DIAGNOSIS — R35.0 URINARY FREQUENCY: Primary | ICD-10-CM

## 2019-09-10 LAB
APPEARANCE FLUID: CLEAR
BILIRUBIN, POC: NEGATIVE
BLOOD URINE, POC: NORMAL
CLARITY, POC: CLEAR
COLOR, POC: YELLOW
GLUCOSE URINE, POC: NEGATIVE
KETONES, POC: NEGATIVE
LEUKOCYTE EST, POC: NEGATIVE
NITRITE, POC: NORMAL
PH, POC: 7
PROTEIN, POC: NEGATIVE
SPECIFIC GRAVITY, POC: >1.03
UROBILINOGEN, POC: 0.2

## 2019-09-10 PROCEDURE — 99212 OFFICE O/P EST SF 10 MIN: CPT | Performed by: NURSE PRACTITIONER

## 2019-09-10 PROCEDURE — 81002 URINALYSIS NONAUTO W/O SCOPE: CPT | Performed by: NURSE PRACTITIONER

## 2019-09-10 RX ORDER — CIPROFLOXACIN 500 MG/1
500 TABLET, FILM COATED ORAL 2 TIMES DAILY
Qty: 14 TABLET | Refills: 0 | Status: SHIPPED | OUTPATIENT
Start: 2019-09-10 | End: 2019-09-17

## 2019-09-10 ASSESSMENT — ENCOUNTER SYMPTOMS
CHOKING: 0
STRIDOR: 0
TROUBLE SWALLOWING: 0
ABDOMINAL PAIN: 0
EYE DISCHARGE: 0
CONSTIPATION: 0
SORE THROAT: 0
COLOR CHANGE: 0
DIARRHEA: 0
NAUSEA: 0
EYE ITCHING: 0
SHORTNESS OF BREATH: 0
COUGH: 0
VOMITING: 0
ABDOMINAL DISTENTION: 0
BLOOD IN STOOL: 0
WHEEZING: 0

## 2019-09-10 NOTE — PROGRESS NOTES
[facility]  [id number] [address]  [address] [address]  Dept: [phone]  Dept Fax: [phone]  Loc: [phone]    Kemal Vanessa is a 38 y.o. female who presents today for her medical conditions/complaints as noted below. Kemal Vanessa is c/rigoberto Urinary Frequency (Patient states urine has strong odor and some frequency x 2-3 days.)        HPI:     HPI   1. Foul odor to her urine for the last 24 hours; She has no dysuria  No fever or hematuria; Chief Complaint   Patient presents with    Urinary Frequency     Patient states urine has strong odor and some frequency x 2-3 days.        Past Medical History:   Diagnosis Date    Anxiety     Class 1 obesity in adult 2019    Shoulder fracture, right 2007      Past Surgical History:   Procedure Laterality Date    HYSTERECTOMY         Vitals 9/10/2019 2019 2019 2019 6/10/2019    SYSTOLIC 530 476 715 290 800 -   DIASTOLIC 72 68 76 64 74 -   Pulse 65 82 68 67 68 -   Temp - - - 97.8 - -   Resp 18 18 18 18 18 -   SpO2 97 98 97 99 98 -   Weight 160 lb 155 lb 157 lb 156 lb 156 lb 144 lb   Height 5' 0\" 5' 0\" 5' 0\" 5' 0\" 5' 0\" -   BMI (wt*703/ht~2) 31.24 kg/m2 30.27 kg/m2 30.66 kg/m2 30.46 kg/m2 30.46 kg/m2 -   Waist (Inches) - - - - - 33   Some recent data might be hidden       Family History   Problem Relation Age of Onset    Diabetes Father     Cancer Maternal Aunt     Diabetes Paternal Grandmother        Social History     Tobacco Use    Smoking status: Former Smoker     Packs/day: 0.25     Last attempt to quit: 2018     Years since quittin.6    Smokeless tobacco: Never Used   Substance Use Topics    Alcohol use: Yes     Comment: social       Current Outpatient Medications   Medication Sig Dispense Refill    ciprofloxacin (CIPRO) 500 MG tablet Take 1 tablet by mouth 2 times daily for 7 days 14 tablet 0    traZODone (DESYREL) 50 MG tablet Take 1 tablet by mouth nightly 90 06/10/2019    K 4.1 06/10/2019     06/10/2019    CO2 27 06/10/2019    BUN 12 06/10/2019    CREATININE 0.8 06/10/2019    GLUCOSE 83 06/10/2019    CALCIUM 9.6 06/10/2019      Lab Results   Component Value Date    WBC 9.6 06/10/2019    HGB 13.4 06/10/2019    HCT 42.1 06/10/2019    MCV 84.5 06/10/2019     06/10/2019    LYMPHOPCT 25.1 06/10/2019    RBC 4.98 06/10/2019    MCH 26.9 (L) 06/10/2019    MCHC 31.8 (L) 06/10/2019    RDW 14.6 (H) 06/10/2019     Lab Results   Component Value Date    VITD25 28.6 (L) 06/10/2019       Subjective:      Review of Systems   Constitutional: Negative for fatigue, fever and unexpected weight change. HENT: Negative for ear discharge, ear pain, mouth sores, sore throat and trouble swallowing. Eyes: Negative for discharge, itching and visual disturbance. Respiratory: Negative for cough, choking, shortness of breath, wheezing and stridor. Cardiovascular: Negative for chest pain, palpitations and leg swelling. Gastrointestinal: Negative for abdominal distention, abdominal pain, blood in stool, constipation, diarrhea, nausea and vomiting. Endocrine: Negative for cold intolerance, polydipsia and polyuria. Genitourinary: Positive for frequency. Negative for difficulty urinating, dysuria and urgency. Odor to urine   Musculoskeletal: Negative for arthralgias and gait problem. Skin: Negative for color change and rash. Allergic/Immunologic: Negative for food allergies and immunocompromised state. Neurological: Negative for dizziness, tremors, syncope, speech difficulty, weakness and headaches. Hematological: Negative for adenopathy. Does not bruise/bleed easily. Psychiatric/Behavioral: Negative for confusion and hallucinations. Objective:     Physical Exam   Constitutional: She is oriented to person, place, and time. She appears well-developed and well-nourished. No distress. HENT:   Head: Normocephalic and atraumatic.    Eyes: Pupils are equal,

## 2019-09-12 LAB
ORGANISM: ABNORMAL
URINE CULTURE, ROUTINE: ABNORMAL
URINE CULTURE, ROUTINE: ABNORMAL

## 2019-10-16 ENCOUNTER — TELEMEDICINE (OUTPATIENT)
Dept: INTERNAL MEDICINE | Age: 44
End: 2019-10-16
Payer: COMMERCIAL

## 2019-10-16 DIAGNOSIS — E66.09 CLASS 2 OBESITY DUE TO EXCESS CALORIES WITHOUT SERIOUS COMORBIDITY WITH BODY MASS INDEX (BMI) OF 35.0 TO 35.9 IN ADULT: Primary | ICD-10-CM

## 2019-10-16 PROBLEM — I25.5 ISCHEMIC CARDIOMYOPATHY: Status: ACTIVE | Noted: 2019-10-16

## 2019-10-16 PROBLEM — I50.33 ACUTE ON CHRONIC DIASTOLIC CONGESTIVE HEART FAILURE (HCC): Status: ACTIVE | Noted: 2019-10-16

## 2019-10-16 PROBLEM — I42.7 CARDIOMYOPATHY SECONDARY TO DRUG (HCC): Status: ACTIVE | Noted: 2019-10-16

## 2019-10-16 PROCEDURE — 99213 OFFICE O/P EST LOW 20 MIN: CPT | Performed by: NURSE PRACTITIONER

## 2019-10-16 RX ORDER — PHENTERMINE HYDROCHLORIDE 37.5 MG/1
37.5 CAPSULE ORAL EVERY MORNING
Qty: 30 CAPSULE | Refills: 0 | Status: SHIPPED | OUTPATIENT
Start: 2019-10-16 | End: 2019-11-13 | Stop reason: SDUPTHER

## 2019-11-06 ENCOUNTER — HOSPITAL ENCOUNTER (OUTPATIENT)
Dept: WOMENS IMAGING | Age: 44
Discharge: HOME | End: 2019-11-06
Payer: COMMERCIAL

## 2019-11-06 DIAGNOSIS — Z12.31 BREAST CANCER SCREENING BY MAMMOGRAM: ICD-10-CM

## 2019-11-06 PROCEDURE — 77063 BREAST TOMOSYNTHESIS BI: CPT

## 2019-11-13 ENCOUNTER — TELEMEDICINE (OUTPATIENT)
Dept: INTERNAL MEDICINE | Age: 44
End: 2019-11-13

## 2019-11-13 DIAGNOSIS — E66.09 CLASS 2 OBESITY DUE TO EXCESS CALORIES WITHOUT SERIOUS COMORBIDITY WITH BODY MASS INDEX (BMI) OF 35.0 TO 35.9 IN ADULT: ICD-10-CM

## 2019-11-13 RX ORDER — PHENTERMINE HYDROCHLORIDE 37.5 MG/1
37.5 CAPSULE ORAL EVERY MORNING
Qty: 30 CAPSULE | Refills: 0 | Status: SHIPPED | OUTPATIENT
Start: 2019-11-13 | End: 2019-12-13

## 2019-11-13 ASSESSMENT — ENCOUNTER SYMPTOMS
ABDOMINAL DISTENTION: 0
SHORTNESS OF BREATH: 0
EYE ITCHING: 0
EYE DISCHARGE: 0
ABDOMINAL PAIN: 0
DIARRHEA: 0
BLOOD IN STOOL: 0
COUGH: 0
TROUBLE SWALLOWING: 0
VOMITING: 0
CHOKING: 0
NAUSEA: 0
WHEEZING: 0
COLOR CHANGE: 0
STRIDOR: 0
CONSTIPATION: 0
SORE THROAT: 0

## 2020-01-07 RX ORDER — PAROXETINE 30 MG/1
TABLET, FILM COATED ORAL
Qty: 30 TABLET | Refills: 2 | Status: SHIPPED | OUTPATIENT
Start: 2020-01-07 | End: 2020-03-27

## 2020-01-07 NOTE — TELEPHONE ENCOUNTER
Natanael Sutherland called requesting a refill of the below medication which has been pended for you:     Requested Prescriptions     Pending Prescriptions Disp Refills    PARoxetine (PAXIL) 30 MG tablet [Pharmacy Med Name: PAROXETINE HCL 30MG TABS] 30 tablet 2     Sig: TAKE 1 TABLET BY MOUTH ONE TIME DAILY       Last Appointment Date: 9/10/2019  Next Appointment Date: Visit date not found    No Known Allergies

## 2020-03-27 RX ORDER — PAROXETINE 30 MG/1
TABLET, FILM COATED ORAL
Qty: 30 TABLET | Refills: 2 | Status: SHIPPED | OUTPATIENT
Start: 2020-03-27 | End: 2020-09-16 | Stop reason: SDUPTHER

## 2020-05-21 ENCOUNTER — HOSPITAL ENCOUNTER (OUTPATIENT)
Dept: ULTRASOUND IMAGING | Age: 45
Discharge: HOME | End: 2020-05-21
Payer: COMMERCIAL

## 2020-05-21 PROCEDURE — 76536 US EXAM OF HEAD AND NECK: CPT

## 2020-06-18 ENCOUNTER — E-VISIT (OUTPATIENT)
Dept: INTERNAL MEDICINE | Age: 45
End: 2020-06-18
Payer: COMMERCIAL

## 2020-06-18 PROCEDURE — 98970 NQHP OL DIG ASSMT&MGMT 5-10: CPT | Performed by: NURSE PRACTITIONER

## 2020-06-18 RX ORDER — VARENICLINE TARTRATE 0.5 MG/1
.5-1 TABLET, FILM COATED ORAL SEE ADMIN INSTRUCTIONS
Qty: 57 TABLET | Refills: 0 | Status: SHIPPED | OUTPATIENT
Start: 2020-06-18 | End: 2020-08-27

## 2020-07-28 LAB
CHOLESTEROL, TOTAL: 144 MG/DL (ref 160–199)
GLUCOSE BLD-MCNC: 91 MG/DL (ref 74–109)
HDLC SERPL-MCNC: 40 MG/DL (ref 65–121)
LDL CHOLESTEROL CALCULATED: 64 MG/DL
TRIGL SERPL-MCNC: 198 MG/DL (ref 0–149)

## 2020-07-28 RX ORDER — TRAZODONE HYDROCHLORIDE 50 MG/1
TABLET ORAL
Qty: 90 TABLET | Refills: 3 | Status: SHIPPED | OUTPATIENT
Start: 2020-07-28 | End: 2020-09-16 | Stop reason: SDUPTHER

## 2020-07-28 NOTE — TELEPHONE ENCOUNTER
Carri Soto called requesting a refill of the below medication which has been pended for you:     Requested Prescriptions     Pending Prescriptions Disp Refills    traZODone (DESYREL) 50 MG tablet [Pharmacy Med Name: traZODone 50MG TAB] 90 tablet 3     Sig: TAKE ONE TABLET ONE TIME DAILY IN THE EVENING       Last Appointment Date: 2/11/2019  Next Appointment Date: Visit date not found    No Known Allergies

## 2020-08-26 ENCOUNTER — OFFICE VISIT (OUTPATIENT)
Dept: INTERNAL MEDICINE | Age: 45
End: 2020-08-26
Payer: COMMERCIAL

## 2020-08-26 VITALS
HEIGHT: 60 IN | WEIGHT: 174 LBS | BODY MASS INDEX: 34.16 KG/M2 | SYSTOLIC BLOOD PRESSURE: 126 MMHG | HEART RATE: 60 BPM | DIASTOLIC BLOOD PRESSURE: 72 MMHG

## 2020-08-26 DIAGNOSIS — R35.0 URINARY FREQUENCY: ICD-10-CM

## 2020-08-26 DIAGNOSIS — E78.5 HYPERLIPIDEMIA, UNSPECIFIED HYPERLIPIDEMIA TYPE: ICD-10-CM

## 2020-08-26 DIAGNOSIS — E61.1 IRON DEFICIENCY: ICD-10-CM

## 2020-08-26 DIAGNOSIS — E04.1 THYROID NODULE: ICD-10-CM

## 2020-08-26 DIAGNOSIS — E55.9 VITAMIN D DEFICIENCY: ICD-10-CM

## 2020-08-26 LAB
ALBUMIN SERPL-MCNC: 4.3 G/DL (ref 3.5–5.2)
ALP BLD-CCNC: 71 U/L (ref 35–104)
ALT SERPL-CCNC: 12 U/L (ref 5–33)
ANION GAP SERPL CALCULATED.3IONS-SCNC: 13 MMOL/L (ref 7–19)
AST SERPL-CCNC: 17 U/L (ref 5–32)
BILIRUB SERPL-MCNC: <0.2 MG/DL (ref 0.2–1.2)
BILIRUBIN URINE: NEGATIVE
BLOOD, URINE: NEGATIVE
BUN BLDV-MCNC: 9 MG/DL (ref 6–20)
CALCIUM SERPL-MCNC: 9.3 MG/DL (ref 8.6–10)
CHLORIDE BLD-SCNC: 99 MMOL/L (ref 98–111)
CLARITY: CLEAR
CO2: 23 MMOL/L (ref 22–29)
COLOR: YELLOW
CREAT SERPL-MCNC: 0.7 MG/DL (ref 0.5–0.9)
GFR AFRICAN AMERICAN: >59
GFR NON-AFRICAN AMERICAN: >60
GLUCOSE BLD-MCNC: 104 MG/DL (ref 74–109)
GLUCOSE URINE: NEGATIVE MG/DL
HCT VFR BLD CALC: 44.2 % (ref 37–47)
HEMOGLOBIN: 14.2 G/DL (ref 12–16)
IRON: 47 UG/DL (ref 37–145)
KETONES, URINE: NEGATIVE MG/DL
LEUKOCYTE ESTERASE, URINE: NEGATIVE
MCH RBC QN AUTO: 26.8 PG (ref 27–31)
MCHC RBC AUTO-ENTMCNC: 32.1 G/DL (ref 33–37)
MCV RBC AUTO: 83.6 FL (ref 81–99)
NITRITE, URINE: NEGATIVE
PDW BLD-RTO: 13.9 % (ref 11.5–14.5)
PH UA: 6 (ref 5–8)
PLATELET # BLD: 214 K/UL (ref 130–400)
PMV BLD AUTO: 12.4 FL (ref 9.4–12.3)
POTASSIUM SERPL-SCNC: 3.7 MMOL/L (ref 3.5–5)
PROTEIN UA: NEGATIVE MG/DL
RBC # BLD: 5.29 M/UL (ref 4.2–5.4)
SODIUM BLD-SCNC: 135 MMOL/L (ref 136–145)
SPECIFIC GRAVITY UA: 1.01 (ref 1–1.03)
TOTAL PROTEIN: 7.2 G/DL (ref 6.6–8.7)
TRIGL SERPL-MCNC: 135 MG/DL (ref 0–149)
TSH SERPL DL<=0.05 MIU/L-ACNC: 1.93 UIU/ML (ref 0.27–4.2)
UROBILINOGEN, URINE: 0.2 E.U./DL
VITAMIN D 25-HYDROXY: 28.3 NG/ML
WBC # BLD: 10.3 K/UL (ref 4.8–10.8)

## 2020-08-26 PROCEDURE — 99214 OFFICE O/P EST MOD 30 MIN: CPT | Performed by: NURSE PRACTITIONER

## 2020-08-26 NOTE — PROGRESS NOTES
18 18 18 18   SpO2 - 97 98 97 99 98   Weight 174 lb 160 lb 155 lb 157 lb 156 lb 156 lb   Height 5' 0\" 5' 0\" 5' 0\" 5' 0\" 5' 0\" 5' 0\"   BMI (wt*703/ht~2) 33.98 kg/m2 31.24 kg/m2 30.27 kg/m2 30.66 kg/m2 30.46 kg/m2 30.46 kg/m2   Waist (Inches) - - - - - -   Some recent data might be hidden       Family History   Problem Relation Age of Onset    Diabetes Father     Cancer Maternal Aunt     Diabetes Paternal Grandmother        Social History     Tobacco Use    Smoking status: Former Smoker     Packs/day: 0.25     Last attempt to quit: 2018     Years since quittin.5    Smokeless tobacco: Never Used   Substance Use Topics    Alcohol use: Yes     Comment: social       Current Outpatient Medications   Medication Sig Dispense Refill    traZODone (DESYREL) 50 MG tablet TAKE ONE TABLET ONE TIME DAILY IN THE EVENING 90 tablet 3    PARoxetine (PAXIL) 30 MG tablet TAKE 1 TABLET BY MOUTH ONE TIME DAILY 30 tablet 2    vitamin D (ERGOCALCIFEROL) 58015 units CAPS capsule Take 1 capsule by mouth once a week (Patient not taking: Reported on 2020) 30 capsule 3    cyclobenzaprine (FLEXERIL) 10 MG tablet Take 10 mg by mouth 3 times daily as needed for Muscle spasms       Current Facility-Administered Medications   Medication Dose Route Frequency Provider Last Rate Last Dose    methylPREDNISolone acetate (DEPO-MEDROL) injection 40 mg  40 mg Intramuscular Once [facility]         No Known Allergies    Health Maintenance   Topic Date Due    Diabetes screen  2019    Flu vaccine (1) 2020    DTaP/Tdap/Td vaccine (2 - Td) 2025    Lipid screen  2025    HIV screen  Completed    Hepatitis A vaccine  Aged Out    Hepatitis B vaccine  Aged Out    Hib vaccine  Aged Out    Meningococcal (ACWY) vaccine  Aged Out    Pneumococcal 0-64 years Vaccine  Aged Out       Lab Results   Component Value Date    LABA1C 5.1 2016     No results found for: PSA, PSADIA  TSH   Date Value Ref Range Status   08/26/2020 1.930 0.270 - 4.200 uIU/mL Final   ]  Lab Results   Component Value Date     (L) 08/26/2020    K 3.7 08/26/2020    CL 99 08/26/2020    CO2 23 08/26/2020    BUN 9 08/26/2020    CREATININE 0.7 08/26/2020    GLUCOSE 104 08/26/2020    CALCIUM 9.3 08/26/2020    PROT 7.2 08/26/2020    LABALBU 4.3 08/26/2020    BILITOT <0.2 08/26/2020    ALKPHOS 71 08/26/2020    AST 17 08/26/2020    ALT 12 08/26/2020    LABGLOM >60 08/26/2020    GFRAA >59 08/26/2020     Lab Results   Component Value Date    CHOL 144 (L) 07/28/2020    CHOL 125 (L) 03/12/2019    CHOL 113 (L) 03/23/2018     Lab Results   Component Value Date    TRIG 135 08/26/2020    TRIG 198 (H) 07/28/2020    TRIG 119 03/12/2019     Lab Results   Component Value Date    HDL 40 (L) 07/28/2020    HDL 47 (L) 03/12/2019    HDL 41 (L) 03/23/2018     Lab Results   Component Value Date    LDLCALC 64 07/28/2020    LDLCALC 54 03/12/2019    LDLCALC 56 03/23/2018     Lab Results   Component Value Date     08/26/2020    K 3.7 08/26/2020    CL 99 08/26/2020    CO2 23 08/26/2020    BUN 9 08/26/2020    CREATININE 0.7 08/26/2020    GLUCOSE 104 08/26/2020    CALCIUM 9.3 08/26/2020      Lab Results   Component Value Date    WBC 10.3 08/26/2020    HGB 14.2 08/26/2020    HCT 44.2 08/26/2020    MCV 83.6 08/26/2020     08/26/2020    LYMPHOPCT 25.1 06/10/2019    RBC 5.29 08/26/2020    MCH 26.8 (L) 08/26/2020    MCHC 32.1 (L) 08/26/2020    RDW 13.9 08/26/2020     Lab Results   Component Value Date    VITD25 28.3 (L) 08/26/2020       Subjective:      Review of Systems   Constitutional: Negative for fatigue, fever and unexpected weight change. HENT: Negative for ear discharge, ear pain, mouth sores, sore throat and trouble swallowing. Eyes: Negative for discharge, itching and visual disturbance. Respiratory: Negative for cough, choking, shortness of breath, wheezing and stridor. Cardiovascular: Negative for chest pain, palpitations and leg swelling. Gastrointestinal: Negative for abdominal distention, abdominal pain, blood in stool, constipation, diarrhea, nausea and vomiting. Endocrine: Negative for cold intolerance, polydipsia and polyuria. Genitourinary: Negative for difficulty urinating, dysuria, frequency and urgency. Musculoskeletal: Negative for arthralgias and gait problem. Skin: Negative for color change and rash. Allergic/Immunologic: Negative for food allergies and immunocompromised state. Neurological: Negative for dizziness, tremors, syncope, speech difficulty, weakness and headaches. Hematological: Negative for adenopathy. Does not bruise/bleed easily. Psychiatric/Behavioral: Negative for confusion and hallucinations. The patient is nervous/anxious. Objective:     Physical Exam  Constitutional:       General: She is not in acute distress. Appearance: She is well-developed. HENT:      Head: Normocephalic and atraumatic. Eyes:      General: No scleral icterus. Right eye: No discharge. Left eye: No discharge. Pupils: Pupils are equal, round, and reactive to light. Neck:      Musculoskeletal: Normal range of motion and neck supple. Thyroid: No thyromegaly. Vascular: No JVD. Cardiovascular:      Rate and Rhythm: Normal rate and regular rhythm. Heart sounds: Normal heart sounds. No murmur. Pulmonary:      Effort: Pulmonary effort is normal. No respiratory distress. Breath sounds: Normal breath sounds. No wheezing or rales. Abdominal:      General: Bowel sounds are normal. There is no distension. Palpations: Abdomen is soft. There is no mass. Tenderness: There is no abdominal tenderness. There is no guarding or rebound. Musculoskeletal: Normal range of motion. General: No tenderness. Skin:     General: Skin is warm and dry. Findings: No erythema or rash. Neurological:      Mental Status: She is alert and oriented to person, place, and time. Cranial Nerves: No cranial nerve deficit. Coordination: Coordination normal.      Deep Tendon Reflexes: Reflexes are normal and symmetric. Reflexes normal.   Psychiatric:         Mood and Affect: Mood is not depressed. Behavior: Behavior normal.         Thought Content: Thought content normal.         Judgment: Judgment normal.       /72   Pulse 60   Ht 5' (1.524 m)   Wt 174 lb (78.9 kg)   BMI 33.98 kg/m²     Assessment:       Diagnosis Orders   1. Hyperlipidemia, unspecified hyperlipidemia type  Triglyceride   2. Iron deficiency  Iron   3. Encounter for screening mammogram for breast cancer  HARVEY DIGITAL SCREEN W OR WO CAD BILATERAL   4. Primary insomnia     5. Anxiety and depression     6. Vitamin D deficiency       Labs reviewedfrom 8/26/2020    Plan:        Patient given educational materials - see patient instructions. Discussed use, benefit, and side effects of prescribed medications. Allpatient questions answered. Pt voiced understanding. Reviewed health maintenance. Instructed to continue current medications, diet and exercise. Patient agreed with treatment plan. Follow up as directed. MEDICATIONS:  No orders of the defined types were placed in this encounter. ORDERS:  Orders Placed This Encounter   Procedures    HARVEY DIGITAL SCREEN W OR WO CAD BILATERAL    Triglyceride    Iron       Follow-up:  Return for yearly physical, have labs done prior to appt. PATIENT INSTRUCTIONS:  Patient Instructions   She had labs drawn after her office visit so this is a summary then we will get her this information #1 hyperlipidemia triglycerides have come down to normal level just clean eating we will keep you there no need to start medicine  2.   Iron deficiency anemia your iron level is just a little low you might want to take another pill to 3 times a week is just 10 above normal so you could start feeling sluggish and if you have heavy periods certainly would be beneficial probably to take 1 at least 3-4 times a week  3. Primary insomnia no changes are necessary  4. Anxiety and depression continue with current medicine plan no changes are necessary  5. Your vitamin D level is low please restart your vitamin D if you need a refill please let us know he will need to take this indefinitely      Electronically signed by FCO Ferrara on 8/27/2020 at 7:45 AM    EMRDragon/transcription disclaimer:  Much of this encounter note is electronic transcription/translation of spoken language to printed texts. The electronic translation of spoken language may be erroneous, or at times,nonsensical words or phrases may be inadvertently transcribed.   Although I have reviewed the note for such errors, some may still exist.

## 2020-08-27 PROBLEM — F51.01 PRIMARY INSOMNIA: Status: ACTIVE | Noted: 2020-08-27

## 2020-08-27 RX ORDER — ERGOCALCIFEROL 1.25 MG/1
50000 CAPSULE ORAL WEEKLY
Qty: 30 CAPSULE | Refills: 3 | Status: SHIPPED | OUTPATIENT
Start: 2020-08-27

## 2020-08-27 ASSESSMENT — ENCOUNTER SYMPTOMS
ABDOMINAL DISTENTION: 0
CHOKING: 0
SORE THROAT: 0
SHORTNESS OF BREATH: 0
WHEEZING: 0
NAUSEA: 0
TROUBLE SWALLOWING: 0
COLOR CHANGE: 0
COUGH: 0
ABDOMINAL PAIN: 0
CONSTIPATION: 0
VOMITING: 0
EYE DISCHARGE: 0
DIARRHEA: 0
STRIDOR: 0
BLOOD IN STOOL: 0
EYE ITCHING: 0

## 2020-08-27 NOTE — PATIENT INSTRUCTIONS
She had labs drawn after her office visit so this is a summary then we will get her this information #1 hyperlipidemia triglycerides have come down to normal level just clean eating we will keep you there no need to start medicine  2. Iron deficiency anemia your iron level is just a little low you might want to take another pill to 3 times a week is just 10 above normal so you could start feeling sluggish and if you have heavy periods certainly would be beneficial probably to take 1 at least 3-4 times a week  3. Primary insomnia no changes are necessary  4. Anxiety and depression continue with current medicine plan no changes are necessary  5.   Your vitamin D level is low please restart your vitamin D if you need a refill please let us know he will need to take this indefinitely

## 2020-09-09 ENCOUNTER — PATIENT MESSAGE (OUTPATIENT)
Dept: INTERNAL MEDICINE | Age: 45
End: 2020-09-09

## 2020-09-10 NOTE — TELEPHONE ENCOUNTER
From: Elsie Walsh  To: FCO Lozano  Sent: 9/9/2020 7:36 PM CDT  Subject: Prescription Question    Do I need to set up another appointment to get back on phentermine? I just had a be well within appointment and didn't ask during that visit.

## 2020-09-16 ENCOUNTER — TELEMEDICINE (OUTPATIENT)
Dept: INTERNAL MEDICINE | Age: 45
End: 2020-09-16
Payer: COMMERCIAL

## 2020-09-16 PROCEDURE — 99213 OFFICE O/P EST LOW 20 MIN: CPT | Performed by: NURSE PRACTITIONER

## 2020-09-16 RX ORDER — PAROXETINE 30 MG/1
TABLET, FILM COATED ORAL
Qty: 30 TABLET | Refills: 2 | Status: SHIPPED | OUTPATIENT
Start: 2020-09-16 | End: 2021-03-03 | Stop reason: ALTCHOICE

## 2020-09-16 RX ORDER — TRAZODONE HYDROCHLORIDE 50 MG/1
50 TABLET ORAL NIGHTLY
Qty: 90 TABLET | Refills: 1 | Status: SHIPPED | OUTPATIENT
Start: 2020-09-16 | End: 2021-08-23

## 2020-09-16 RX ORDER — PHENTERMINE HYDROCHLORIDE 30 MG/1
30 CAPSULE ORAL EVERY MORNING
Qty: 30 CAPSULE | Refills: 0 | Status: SHIPPED | OUTPATIENT
Start: 2020-09-16 | End: 2020-10-21 | Stop reason: SDUPTHER

## 2020-09-16 ASSESSMENT — ENCOUNTER SYMPTOMS
CONSTIPATION: 0
WHEEZING: 0
DIARRHEA: 0
SHORTNESS OF BREATH: 0
BLOOD IN STOOL: 0
VOMITING: 0
COUGH: 0
EYE DISCHARGE: 0
CHOKING: 0
TROUBLE SWALLOWING: 0
ABDOMINAL DISTENTION: 0
ABDOMINAL PAIN: 0
NAUSEA: 0
SORE THROAT: 0
EYE ITCHING: 0
STRIDOR: 0
COLOR CHANGE: 0

## 2020-09-16 NOTE — PATIENT INSTRUCTIONS
We will try phentermine 30 mg daily try to take around 10 11:00 in the morning so we will last longer for you. I would like for you to weigh first thing in the morning at your home after you urinate and take all your close off so we will have a dry white scale on a daily basis to compare to. It would also be good if you can get a blood pressure cuff at your house to keep up with your blood pressure but if you do go to some of the offices by your office sit down and wait just a few minutes before that he have them check it. Blood pressures were things were good and need to keep an eye on with you taking phentermine.

## 2020-09-16 NOTE — PROGRESS NOTES
[facility]  [address] [facility] [address] [address]  Dept: [phone]  Dept Fax: [phone]  Loc: [phone]    Rodo Moyer is a 54 y.o. female who were are performing tele health communication  for her medical conditions/complaints as noted below. Currently, our state is under umbrella of national state of emergency and we are using alternative methods of taking care of the needs of our patients so they are not exposed in our office; The patient has consented to this form of communication  Rodo Moyer is c/rigoberto   Obesity    THE patient is at work   Betsey Clarke is at office       HPI:     HPI   1. Obesity  Weight today is 178.8  But she was at work with all her clothes on ;     She has a stepper and weight machine at work   She wants to try phentermine   Chief Complaint   Patient presents with    Obesity       Past Medical History:   Diagnosis Date    Acute on chronic diastolic congestive heart failure ([facility] [address] 10/16/2019    Anxiety     Class 1 obesity in adult 2019    Shoulder fracture, right 2007      Past Surgical History:   Procedure Laterality Date    HYSTERECTOMY         Vitals 2020 9/10/2019 2019 2019 2019    SYSTOLIC 156 696 [phone]   DIASTOLIC 72 72 68 76 64 74   Pulse 60 65 82 68 67 68   Temp - - - - 97.8 -   Resp - 18 18 18 18 18   SpO2 - 97 98 97 99 98   Weight 174 lb 160 lb 155 lb 157 lb 156 lb 156 lb   Height 5' 0\" 5' 0\" 5' 0\" 5' 0\" 5' 0\" 5' 0\"   BMI (wt*703/ht~2) 33.98 kg/m2 31.24 kg/m2 30.27 kg/m2 30.66 kg/m2 30.46 kg/m2 30.46 kg/m2   Waist (Inches) - - - - - -   Some recent data might be hidden       Family History   Problem Relation Age of Onset    Diabetes Father     Cancer Maternal Aunt     Diabetes Paternal Grandmother        Social History     Tobacco Use    Smoking status: Former Smoker     Packs/day: 0.25     Last attempt to quit: 2018     Years since quittin.6    Smokeless tobacco: Never Used   Substance Use Topics    Alcohol use: Yes     Comment: social       Current Outpatient Medications   Medication Sig Dispense Refill    phentermine 30 MG capsule Take 1 capsule by mouth every morning for 30 days.  30 capsule 0    traZODone (DESYREL) 50 MG tablet Take 1 tablet by mouth nightly 90 tablet 1    vitamin D (ERGOCALCIFEROL) 1.25 MG (66118 UT) CAPS capsule Take 1 capsule by mouth once a week 30 capsule 3    PARoxetine (PAXIL) 30 MG tablet TAKE 1 TABLET BY MOUTH ONE TIME DAILY 30 tablet 2    cyclobenzaprine (FLEXERIL) 10 MG tablet Take 10 mg by mouth 3 times daily as needed for Muscle spasms       Current Facility-Administered Medications   Medication Dose Route Frequency Provider Last Rate Last Dose    methylPREDNISolone acetate (DEPO-MEDROL) injection 40 mg  40 mg Intramuscular Once [facility]         No Known Allergies    Health Maintenance   Topic Date Due    Diabetes screen  09/26/2019    Flu vaccine (1) 09/01/2020    DTaP/Tdap/Td vaccine (2 - Td) 01/09/2025    Lipid screen  07/28/2025    HIV screen  Completed    Hepatitis A vaccine  Aged Out    Hepatitis B vaccine  Aged Out    Hib vaccine  Aged Out    Meningococcal (ACWY) vaccine  Aged Out    Pneumococcal 0-64 years Vaccine  Aged Out       Lab Results   Component Value Date    LABA1C 5.1 09/26/2016     No results found for: PSA, PSADIA  TSH   Date Value Ref Range Status   08/26/2020 1.930 0.270 - 4.200 uIU/mL Final   ]  Lab Results   Component Value Date     (L) 08/26/2020    K 3.7 08/26/2020    CL 99 08/26/2020    CO2 23 08/26/2020    BUN 9 08/26/2020    CREATININE 0.7 08/26/2020    GLUCOSE 104 08/26/2020    CALCIUM 9.3 08/26/2020    PROT 7.2 08/26/2020    LABALBU 4.3 08/26/2020    BILITOT <0.2 08/26/2020    ALKPHOS 71 08/26/2020    AST 17 08/26/2020    ALT 12 08/26/2020    LABGLOM >60 08/26/2020    GFRAA >59 08/26/2020     Lab Results   Component Value Date    CHOL 144 (L) 07/28/2020 difficulty, weakness and headaches. Hematological: Negative for adenopathy. Does not bruise/bleed easily. Psychiatric/Behavioral: Negative for confusion and hallucinations. Objective:     Physical Exam   DGEVISITPE      GENERAL:   Afebrile alert no acute distress  Respiratory no use of accessory muscles no acute distress no audible wheezing  Mental status alert oriented adequate thought processes        Vital signs were not taken at this visit as no equipment was available; There were no vitals taken for this visit. Assessment:       Diagnosis Orders   1. Obesity (BMI 35.0-39.9 without comorbidity)  phentermine 30 MG capsule       Plan:        Patient given educational materials - see patient instructions. Discussed use, benefit, and side effects of prescribed medications. Allpatient questions answered. Pt voiced understanding. Reviewed health maintenance. Instructed to continue current medications, diet and exercise. Patient agreed with treatment plan. Follow up as directed. MEDICATIONS:  Orders Placed This Encounter   Medications    phentermine 30 MG capsule     Sig: Take 1 capsule by mouth every morning for 30 days. Dispense:  30 capsule     Refill:  0    traZODone (DESYREL) 50 MG tablet     Sig: Take 1 tablet by mouth nightly     Dispense:  90 tablet     Refill:  1         ORDERS:  No orders of the defined types were placed in this encounter. Follow-up:  Return in about 4 weeks (around 10/14/2020). Following the remote visit today we will call you when we are available to reschedule your follow up appt      PATIENT INSTRUCTIONS:  Patient Instructions   We will try phentermine 30 mg daily try to take around 10 11:00 in the morning so we will last longer for you. I would like for you to weigh first thing in the morning at your home after you urinate and take all your close off so we will have a dry white scale on a daily basis to compare to.   It would also be good if you can get a blood pressure cuff at your house to keep up with your blood pressure but if you do go to some of the offices by your office sit down and wait just a few minutes before that he have them check it. Blood pressures were things were good and need to keep an eye on with you taking phentermine. Electronically signed by Leopoldo Eis, APRN on 9/16/2020 at 3:24 PM  Video visit   We are communicating with telehealth for the 3 month fu for controlled substance      Pursuant to the emergency declaration under the [facility], [address] waiver authority and the [employer] and [employer] Act, this Virtual Visit was conducted, with patient's consent, to reduce the patient's risk of exposure to COVID-19 and provide continuity of care for an established patient.        EMRDragon/transcription disclaimer:  Much of this encounter note is electronic

## 2020-10-21 ENCOUNTER — VIRTUAL VISIT (OUTPATIENT)
Dept: INTERNAL MEDICINE | Age: 45
End: 2020-10-21
Payer: COMMERCIAL

## 2020-10-21 PROCEDURE — 99212 OFFICE O/P EST SF 10 MIN: CPT | Performed by: NURSE PRACTITIONER

## 2020-10-21 RX ORDER — PHENTERMINE HYDROCHLORIDE 30 MG/1
30 CAPSULE ORAL EVERY MORNING
Qty: 30 CAPSULE | Refills: 0 | Status: SHIPPED | OUTPATIENT
Start: 2020-10-21 | End: 2020-11-20

## 2020-10-21 ASSESSMENT — ENCOUNTER SYMPTOMS: SHORTNESS OF BREATH: 0

## 2020-10-21 NOTE — PROGRESS NOTES
voiced understanding. Reviewed health maintenance. Instructed to continue current medications, diet and exercise. Patient agreed with treatment plan. Follow up as directed. MEDICATIONS:  Orders Placed This Encounter   Medications    phentermine 30 MG capsule     Sig: Take 1 capsule by mouth every morning for 30 days. Dispense:  30 capsule     Refill:  0         ORDERS:  No orders of the defined types were placed in this encounter. Follow-up:  Return in 4 weeks (on 11/18/2020) for video visit  brandon  . Following the remote visit today we will call you when we are available to reschedule your follow up appt      PATIENT INSTRUCTIONS:  Patient Instructions   1. Obesity continue being sure he had plain proteins plenty of water. Refill of the phentermine 30 mg daily. If you start noticing that you are having more hunger in the evenings you can move your dose up to later in the day. We will follow-up 1 next month for a refill and then determine at that time what we need are interval stability. Electronically signed by [facility] Valdez on 10/21/2020 at 10:18 AM   brandon visit   We are communicating with telehealth for the 3 month fu for controlled substance      Pursuant to the emergency declaration under the [address] [facility], [address] waiver authority and the [employer] and [employer] General Act, this Virtual Visit was conducted, with patient's consent, to reduce the patient's risk of exposure to COVID-19 and provide continuity of care for an established patient.        EMRDragon/transcription disclaimer:  Much of this encounter note is electronic

## 2020-10-21 NOTE — PATIENT INSTRUCTIONS
1.  Obesity continue being sure he had plain proteins plenty of water. Refill of the phentermine 30 mg daily. If you start noticing that you are having more hunger in the evenings you can move your dose up to later in the day. We will follow-up 1 next month for a refill and then determine at that time what we need are interval stability.

## 2020-11-19 ENCOUNTER — HOSPITAL ENCOUNTER (OUTPATIENT)
Dept: WOMENS IMAGING | Age: 45
Discharge: HOME | End: 2020-11-19
Payer: COMMERCIAL

## 2020-11-19 PROCEDURE — 77063 BREAST TOMOSYNTHESIS BI: CPT

## 2021-01-02 ENCOUNTER — OFFICE VISIT (OUTPATIENT)
Age: 46
End: 2021-01-02

## 2021-01-02 VITALS — HEART RATE: 64 BPM | OXYGEN SATURATION: 99 % | TEMPERATURE: 96.6 F

## 2021-01-02 DIAGNOSIS — Z11.59 SCREENING FOR VIRAL DISEASE: Primary | ICD-10-CM

## 2021-01-02 LAB — SARS-COV-2, PCR: NOT DETECTED

## 2021-01-02 PROCEDURE — 99999 PR OFFICE/OUTPT VISIT,PROCEDURE ONLY: CPT | Performed by: NURSE PRACTITIONER

## 2021-03-03 ENCOUNTER — OFFICE VISIT (OUTPATIENT)
Dept: INTERNAL MEDICINE | Age: 46
End: 2021-03-03
Payer: COMMERCIAL

## 2021-03-03 VITALS
HEART RATE: 60 BPM | SYSTOLIC BLOOD PRESSURE: 122 MMHG | HEIGHT: 60 IN | BODY MASS INDEX: 32.98 KG/M2 | DIASTOLIC BLOOD PRESSURE: 74 MMHG | WEIGHT: 168 LBS

## 2021-03-03 DIAGNOSIS — E04.1 THYROID NODULE: ICD-10-CM

## 2021-03-03 DIAGNOSIS — N39.3 STRESS INCONTINENCE: ICD-10-CM

## 2021-03-03 DIAGNOSIS — Z00.00 WELL ADULT EXAM: ICD-10-CM

## 2021-03-03 DIAGNOSIS — E55.9 VITAMIN D DEFICIENCY: Primary | ICD-10-CM

## 2021-03-03 DIAGNOSIS — F51.01 PRIMARY INSOMNIA: ICD-10-CM

## 2021-03-03 PROCEDURE — 99396 PREV VISIT EST AGE 40-64: CPT | Performed by: NURSE PRACTITIONER

## 2021-03-03 ASSESSMENT — ENCOUNTER SYMPTOMS
SHORTNESS OF BREATH: 0
COLOR CHANGE: 0
COUGH: 0
TROUBLE SWALLOWING: 0
CHOKING: 0
EYE DISCHARGE: 0
WHEEZING: 0
EYE ITCHING: 0
STRIDOR: 0
ABDOMINAL DISTENTION: 0
SORE THROAT: 0
DIARRHEA: 0
CONSTIPATION: 0
BLOOD IN STOOL: 0
ABDOMINAL PAIN: 0
VOMITING: 0
NAUSEA: 0

## 2021-03-03 NOTE — PROGRESS NOTES
[facility] PHYSICIAN SERVICES  [facility] INTERNAL MEDICINE  [address] [facility] [address]  [facility] [place] [address]  Dept: [phone]  Dept Fax: [phone]  Loc: [phone]    Petar Piña (:  1975) is a 51 y.o. female,Established patient, here for evaluation of the following chief complaint(s): Annual Exam (Patient is here for physical and pap smear. Patient has not had labs done.)      Petar Piña is a 51 y.o. female who presents today for her medical conditions/complaints as noted below. Petar Piña is c/rigoberto Annual Exam (Patient is here for physical and pap smear. Patient has not had labs done.)        HPI:     HPI   1. Depression; she has weaned herself of paxil, doing well she feels like she does not need it any longer  2. Vit d def; taking 50,000 units weekly she will get her blood drawn in the next few days. 3.  Insomnia; She is taking trazadone 50 mg at bedtime this helps her sleep well  4. Thyroid nodule she reports that she is followed by Dr. Selma Chang she had a office visit with Dr. Joan Burrows 19 with the thyroid ultrasound revealing nodules. She thought she had seen Dr. Selma Chang last year but she goes there with both of her children so she actually did not have them visit with him but there was a thyroid ultrasound done in May 2020. I did ask her to send a [employer] message or call their office as she probably needs to have an office visit this year after she has the ultrasound in May. #4 she describes stress incontinence with sneezing laughing. Being incontinent of urine  Chief Complaint   Patient presents with    Annual Exam     Patient is here for physical and pap smear. Patient has not had labs done.        Past Medical History:   Diagnosis Date    Acute on chronic diastolic congestive heart failure ([address] 10/16/2019    Anxiety     Class 1 obesity in adult 2019    Shoulder fracture, right 2007    Stress incontinence 3/3/2021      Past Surgical History:   Procedure Laterality Date No results found for: PSA, PSADIA  TSH   Date Value Ref Range Status   08/26/2020 1.930 0.270 - 4.200 uIU/mL Final   ]  Lab Results   Component Value Date     (L) 08/26/2020    K 3.7 08/26/2020    CL 99 08/26/2020    CO2 23 08/26/2020    BUN 9 08/26/2020    CREATININE 0.7 08/26/2020    GLUCOSE 104 08/26/2020    CALCIUM 9.3 08/26/2020    PROT 7.2 08/26/2020    LABALBU 4.3 08/26/2020    BILITOT <0.2 08/26/2020    ALKPHOS 71 08/26/2020    AST 17 08/26/2020    ALT 12 08/26/2020    LABGLOM >60 08/26/2020    GFRAA >59 08/26/2020     Lab Results   Component Value Date    CHOL 144 (L) 07/28/2020    CHOL 125 (L) 03/12/2019    CHOL 113 (L) 03/23/2018     Lab Results   Component Value Date    TRIG 135 08/26/2020    TRIG 198 (H) 07/28/2020    TRIG 119 03/12/2019     Lab Results   Component Value Date    HDL 40 (L) 07/28/2020    HDL 47 (L) 03/12/2019    HDL 41 (L) 03/23/2018     Lab Results   Component Value Date    LDLCALC 64 07/28/2020    LDLCALC 54 03/12/2019    LDLCALC 56 03/23/2018     Lab Results   Component Value Date     08/26/2020    K 3.7 08/26/2020    CL 99 08/26/2020    CO2 23 08/26/2020    BUN 9 08/26/2020    CREATININE 0.7 08/26/2020    GLUCOSE 104 08/26/2020    CALCIUM 9.3 08/26/2020      Lab Results   Component Value Date    WBC 10.3 08/26/2020    HGB 14.2 08/26/2020    HCT 44.2 08/26/2020    MCV 83.6 08/26/2020     08/26/2020    LYMPHOPCT 25.1 06/10/2019    RBC 5.29 08/26/2020    MCH 26.8 (L) 08/26/2020    MCHC 32.1 (L) 08/26/2020    RDW 13.9 08/26/2020     Lab Results   Component Value Date    VITD25 28.3 (L) 08/26/2020       Subjective:      Review of Systems   Constitutional: Negative for fatigue, fever and unexpected weight change. HENT: Negative for ear discharge, ear pain, mouth sores, sore throat and trouble swallowing. Thyroid nodules   Eyes: Negative for discharge, itching and visual disturbance.    Respiratory: Negative for cough, choking, shortness of breath, wheezing and stridor. Cardiovascular: Negative for chest pain, palpitations and leg swelling. Gastrointestinal: Negative for abdominal distention, abdominal pain, blood in stool, constipation, diarrhea, nausea and vomiting. Endocrine: Negative for cold intolerance, polydipsia and polyuria. Genitourinary: Negative for difficulty urinating, dysuria, frequency and urgency. Musculoskeletal: Negative for arthralgias and gait problem. Skin: Negative for color change and rash. Allergic/Immunologic: Negative for food allergies and immunocompromised state. Neurological: Negative for dizziness, tremors, syncope, speech difficulty, weakness and headaches. Insomnia   Hematological: Negative for adenopathy. Does not bruise/bleed easily. Psychiatric/Behavioral: Negative for confusion and hallucinations. Objective:     Physical Exam  Constitutional:       General: She is not in acute distress. Appearance: She is well-developed. HENT:      Head: Normocephalic and atraumatic. Eyes:      General: No scleral icterus. Right eye: No discharge. Left eye: No discharge. Pupils: Pupils are equal, round, and reactive to light. Neck:      Musculoskeletal: Normal range of motion and neck supple. Thyroid: No thyromegaly. Vascular: No JVD. Comments: Lateral thyroid nodules  Cardiovascular:      Rate and Rhythm: Normal rate and regular rhythm. Heart sounds: Normal heart sounds. No murmur. Pulmonary:      Effort: Pulmonary effort is normal. No respiratory distress. Breath sounds: Normal breath sounds. No wheezing or rales. Chest:      Breasts: Breasts are symmetrical.         Right: Normal.         Left: Normal.   Abdominal:      General: Bowel sounds are normal. There is no distension. Palpations: Abdomen is soft. There is no mass. Tenderness: There is no abdominal tenderness. There is no guarding or rebound.       Hernia: There is no hernia in the left inguinal area or right inguinal area. Genitourinary:     General: Normal vulva. Exam position: Lithotomy position. Labia:         Right: No rash, tenderness, lesion or injury. Left: No rash, tenderness, lesion or injury. Comments: Pap smear obtained cervical tip ovaries are not palpable there is no tenderness on exam  Musculoskeletal: Normal range of motion. General: No tenderness. Lymphadenopathy:      Lower Body: No right inguinal adenopathy. No left inguinal adenopathy. Skin:     General: Skin is warm and dry. Findings: No erythema or rash. Neurological:      Mental Status: She is alert and oriented to person, place, and time. Cranial Nerves: No cranial nerve deficit. Coordination: Coordination normal.      Deep Tendon Reflexes: Reflexes are normal and symmetric. Reflexes normal.   Psychiatric:         Mood and Affect: Mood is not depressed. Behavior: Behavior normal.         Thought Content: Thought content normal.         Judgment: Judgment normal.       /74   Pulse 60   Ht 5' (1.524 m)   Wt 168 lb (76.2 kg)   BMI 32.81 kg/m²     Assessment:       Diagnosis Orders   1. Vitamin D deficiency  Vitamin D 25 Hydroxy   2. Well adult exam  Hemoglobin A1C    Hepatitis C Antibody    CBC Auto Differential    Comprehensive Metabolic Panel    Vitamin D 25 Hydroxy    Urinalysis Reflex to Culture    TSH without Reflex    Lipid Panel    Iron    PAP SMEAR   3. Thyroid nodule  TSH without Reflex   4. Primary insomnia     5. Stress incontinence       Labs reviewed from 2020  Diagnostics reviewed from  2020 mammogram and 5/2020  Us thryoids  Plan:        Patient given educational materials - see patient instructions. Discussed use, benefit, and side effects of prescribed medications. Allpatient questions answered. Pt voiced understanding. Reviewed health maintenance. Instructed to continue current medications, diet and exercise.   Patient agreed with treatment plan. Follow up as directed. MEDICATIONS:  No orders of the defined types were placed in this encounter. ORDERS:  Orders Placed This Encounter   Procedures    Hemoglobin A1C    Hepatitis C Antibody    CBC Auto Differential    Comprehensive Metabolic Panel    Vitamin D 25 Hydroxy    Urinalysis Reflex to Culture    TSH without Reflex    Lipid Panel    Iron    PAP SMEAR       Follow-up:  Return in about 1 year (around 3/3/2022) for have labs done prior to appt. PATIENT INSTRUCTIONS:  Patient Instructions   1. Depression she is doing well off Paxil no changes are necessary  2. Vitamin D deficiency she continues to take 50,000 units weekly she will get her blood drawn this week  3. Insomnia stable on trazodone no changes    4. Thyroid nodule you do need to call ENT office and set up an appointment and ensure that someone is setting up your ultrasound  #5 stress incontinence I would try Kegel maneuvers in 1 procedure that has helped well is make her stove stop urinating 3-4 times every time you urinate. Electronically signed by [facility] on 3/3/2021 at 8:47 AM       EMRDragon/transcription disclaimer:  Much of this encounter note is electronic transcription/translation of spoken language to printed texts. The electronic translation of spoken language may be erroneous, or at times,nonsensical words or phrases may be inadvertently transcribed.   Although I have reviewed the note for such errors, some may still exist.

## 2021-03-03 NOTE — PATIENT INSTRUCTIONS
1.  Depression she is doing well off Paxil no changes are necessary  2. Vitamin D deficiency she continues to take 50,000 units weekly she will get her blood drawn this week  3. Insomnia stable on trazodone no changes    4. Thyroid nodule you do need to call ENT office and set up an appointment and ensure that someone is setting up your ultrasound  #5 stress incontinence I would try Kegel maneuvers in 1 procedure that has helped well is make her stove stop urinating 3-4 times every time you urinate.

## 2021-03-08 DIAGNOSIS — E04.1 THYROID NODULE: ICD-10-CM

## 2021-03-08 DIAGNOSIS — E55.9 VITAMIN D DEFICIENCY: ICD-10-CM

## 2021-03-08 DIAGNOSIS — Z00.00 WELL ADULT EXAM: ICD-10-CM

## 2021-03-08 LAB
ALBUMIN SERPL-MCNC: 4 G/DL (ref 3.5–5.2)
ALP BLD-CCNC: 72 U/L (ref 35–104)
ALT SERPL-CCNC: 16 U/L (ref 5–33)
ANION GAP SERPL CALCULATED.3IONS-SCNC: 10 MMOL/L (ref 7–19)
AST SERPL-CCNC: 12 U/L (ref 5–32)
BACTERIA: NEGATIVE /HPF
BASOPHILS ABSOLUTE: 0 K/UL (ref 0–0.2)
BASOPHILS RELATIVE PERCENT: 0.5 % (ref 0–1)
BILIRUB SERPL-MCNC: <0.2 MG/DL (ref 0.2–1.2)
BILIRUBIN URINE: NEGATIVE
BLOOD, URINE: ABNORMAL
BUN BLDV-MCNC: 10 MG/DL (ref 6–20)
CALCIUM SERPL-MCNC: 9 MG/DL (ref 8.6–10)
CHLORIDE BLD-SCNC: 101 MMOL/L (ref 98–111)
CHOLESTEROL, TOTAL: 129 MG/DL (ref 160–199)
CLARITY: CLEAR
CO2: 25 MMOL/L (ref 22–29)
COLOR: YELLOW
CREAT SERPL-MCNC: 0.6 MG/DL (ref 0.5–0.9)
CRYSTALS, UA: ABNORMAL /HPF
EOSINOPHILS ABSOLUTE: 0.1 K/UL (ref 0–0.6)
EOSINOPHILS RELATIVE PERCENT: 1.5 % (ref 0–5)
EPITHELIAL CELLS, UA: 2 /HPF (ref 0–5)
GFR AFRICAN AMERICAN: >59
GFR NON-AFRICAN AMERICAN: >60
GLUCOSE BLD-MCNC: 86 MG/DL (ref 74–109)
GLUCOSE URINE: NEGATIVE MG/DL
HBA1C MFR BLD: 5.4 % (ref 4–6)
HCT VFR BLD CALC: 42.5 % (ref 37–47)
HDLC SERPL-MCNC: 43 MG/DL (ref 65–121)
HEMOGLOBIN: 13.9 G/DL (ref 12–16)
HEPATITIS C ANTIBODY INTERPRETATION: NORMAL
HYALINE CASTS: 2 /HPF (ref 0–8)
IMMATURE GRANULOCYTES #: 0 K/UL
IRON: 92 UG/DL (ref 37–145)
KETONES, URINE: NEGATIVE MG/DL
LDL CHOLESTEROL CALCULATED: 46 MG/DL
LEUKOCYTE ESTERASE, URINE: NEGATIVE
LYMPHOCYTES ABSOLUTE: 2.1 K/UL (ref 1.1–4.5)
LYMPHOCYTES RELATIVE PERCENT: 31.3 % (ref 20–40)
MCH RBC QN AUTO: 26.8 PG (ref 27–31)
MCHC RBC AUTO-ENTMCNC: 32.7 G/DL (ref 33–37)
MCV RBC AUTO: 81.9 FL (ref 81–99)
MONOCYTES ABSOLUTE: 0.5 K/UL (ref 0–0.9)
MONOCYTES RELATIVE PERCENT: 6.9 % (ref 0–10)
NEUTROPHILS ABSOLUTE: 4 K/UL (ref 1.5–7.5)
NEUTROPHILS RELATIVE PERCENT: 59.5 % (ref 50–65)
NITRITE, URINE: NEGATIVE
PDW BLD-RTO: 12.8 % (ref 11.5–14.5)
PH UA: 7 (ref 5–8)
PLATELET # BLD: 184 K/UL (ref 130–400)
PMV BLD AUTO: 12.9 FL (ref 9.4–12.3)
POTASSIUM SERPL-SCNC: 4.2 MMOL/L (ref 3.5–5)
PROTEIN UA: NEGATIVE MG/DL
RBC # BLD: 5.19 M/UL (ref 4.2–5.4)
RBC UA: 9 /HPF (ref 0–4)
SODIUM BLD-SCNC: 136 MMOL/L (ref 136–145)
SPECIFIC GRAVITY UA: 1.02 (ref 1–1.03)
TOTAL PROTEIN: 6.7 G/DL (ref 6.6–8.7)
TRIGL SERPL-MCNC: 199 MG/DL (ref 0–149)
TSH SERPL DL<=0.05 MIU/L-ACNC: 2.96 UIU/ML (ref 0.27–4.2)
UROBILINOGEN, URINE: 0.2 E.U./DL
VITAMIN D 25-HYDROXY: 22.8 NG/ML
WBC # BLD: 6.7 K/UL (ref 4.8–10.8)
WBC UA: 1 /HPF (ref 0–5)

## 2021-06-24 LAB
CHOLESTEROL, TOTAL: 118 MG/DL (ref 160–199)
GLUCOSE BLD-MCNC: 91 MG/DL (ref 74–109)
HDLC SERPL-MCNC: 37 MG/DL (ref 65–121)
LDL CHOLESTEROL CALCULATED: 60 MG/DL
TRIGL SERPL-MCNC: 103 MG/DL (ref 0–149)

## 2021-08-23 RX ORDER — TRAZODONE HYDROCHLORIDE 50 MG/1
TABLET ORAL
Qty: 90 TABLET | Refills: 3 | Status: SHIPPED | OUTPATIENT
Start: 2021-08-23

## 2021-08-23 NOTE — TELEPHONE ENCOUNTER
Christine Abarca called requesting a refill of the below medication which has been pended for you:     Requested Prescriptions     Pending Prescriptions Disp Refills    traZODone (DESYREL) 50 MG tablet [Pharmacy Med Name: TRAZODONE HCL 50MG TABS] 90 tablet 3     Sig: TAKE 1 TABLET BY MOUTH EVERY EVENING       Last Appointment Date: 3/3/2021  Next Appointment Date: Visit date not found    No Known Allergies

## 2021-09-14 ENCOUNTER — VIRTUAL VISIT (OUTPATIENT)
Dept: INTERNAL MEDICINE | Age: 46
End: 2021-09-14
Payer: COMMERCIAL

## 2021-09-14 DIAGNOSIS — F41.9 ANXIETY: ICD-10-CM

## 2021-09-14 DIAGNOSIS — U07.1 COVID-19: ICD-10-CM

## 2021-09-14 PROCEDURE — 99213 OFFICE O/P EST LOW 20 MIN: CPT | Performed by: NURSE PRACTITIONER

## 2021-09-14 RX ORDER — BUSPIRONE HYDROCHLORIDE 5 MG/1
5 TABLET ORAL 3 TIMES DAILY
Qty: 90 TABLET | Refills: 0 | Status: SHIPPED | OUTPATIENT
Start: 2021-09-14 | End: 2021-10-14

## 2021-09-14 RX ORDER — PAROXETINE 10 MG/1
10 TABLET, FILM COATED ORAL DAILY
Qty: 30 TABLET | Refills: 3 | Status: SHIPPED | OUTPATIENT
Start: 2021-09-14 | End: 2022-02-15

## 2021-09-14 ASSESSMENT — ENCOUNTER SYMPTOMS
COUGH: 1
EYE ITCHING: 0
CONSTIPATION: 0
VOMITING: 0
DIARRHEA: 0
ABDOMINAL PAIN: 0
CHOKING: 0
EYE DISCHARGE: 0
STRIDOR: 0
TROUBLE SWALLOWING: 0
COLOR CHANGE: 0
NAUSEA: 0
ABDOMINAL DISTENTION: 0
WHEEZING: 0
SHORTNESS OF BREATH: 0
SORE THROAT: 0
BLOOD IN STOOL: 0

## 2021-09-14 NOTE — ASSESSMENT & PLAN NOTE
We will start Paxil 10 mg daily at in addition to BuSpar 5 mg up to 3 times a day as needed until least the Paxil can kick in.

## 2021-09-14 NOTE — PROGRESS NOTES
[address] [facility]  [address] [address] [place] [address]  [address] [address]  Dept: [phone]  Dept Fax: [phone] 33: 71 Manuel Clayton (:  1975) is a 51 y.o. female,Established patient, here for evaluation of the following chief complaint(s): Anxiety      Austin Kraft is a 51 y.o. female who were are performing tele health communication  for her medical conditions/complaints as noted below. Currently, our state is under umbrella of national state of emergency and we are using alternative methods of taking care of the needs of our patients so they are not exposed in our office; The patient has consented to this form of communication  Austin Kraft is c/rigoberto   Anxiety    THE patient is at home  Dhara Nurse is at office   Others in attendance are none    HPI:     Chief Complaint   Patient presents with    Anxiety     HPI   1. Loss of smell and taste ; her COVID was positive last ; Sat ping be the end of her quarantine   2. Anxiety   she had been on Paxil in the past but had gotten off of it she felt she did not need it any longer. She is super stressed at work right now with her boss being out and she is having to do all the meetings in minutes +3 in the office.   She had palpitations almost to the point of panic attack  Past Medical History:   Diagnosis Date    Acute on chronic diastolic congestive heart failure ([address] 10/16/2019    Anxiety     Class 1 obesity in adult 2019    Shoulder fracture, right 2007    Stress incontinence 3/3/2021      Past Surgical History:   Procedure Laterality Date    HYSTERECTOMY         Vitals 3/3/2021 2021 2020 9/10/2019 2019 6614   SYSTOLIC 123 - 310 603 134 266   DIASTOLIC 74 - 72 72 68 76   Pulse 60 64 60 65 82 68   Temp - 96.6 - - - -   Resp - - - 18 18 18   SpO2 - 99 - 97 98 97   Weight 168 lb - 174 lb 160 lb 155 lb 157 lb   Height 5' 0\" - 5' 0\" 5' 0\" 5' 0\" 5' 0\"   Body mass index 32.81 kg/m2 - 33.98 kg/m2 31.24 kg/m2 30.27 kg/m2 30.66 kg/m2   Waist (Inches) - - - - - -   Some recent data might be hidden       Family History   Problem Relation Age of Onset    Diabetes Father     Cancer Maternal Aunt     Diabetes Paternal Grandmother        Social History     Tobacco Use    Smoking status: Former Smoker     Packs/day: 0.25     Quit date: 1/28/2018     Years since quitting: 3.6    Smokeless tobacco: Never Used   Substance Use Topics    Alcohol use: Yes     Comment: social       Current Outpatient Medications   Medication Sig Dispense Refill    PARoxetine (PAXIL) 10 MG tablet Take 1 tablet by mouth daily 30 tablet 3    busPIRone (BUSPAR) 5 MG tablet Take 1 tablet by mouth 3 times daily 90 tablet 0    traZODone (DESYREL) 50 MG tablet TAKE 1 TABLET BY MOUTH EVERY EVENING 90 tablet 3    vitamin D (ERGOCALCIFEROL) 1.25 MG (18085 UT) CAPS capsule Take 1 capsule by mouth once a week 30 capsule 3    cyclobenzaprine (FLEXERIL) 10 MG tablet Take 10 mg by mouth 3 times daily as needed for Muscle spasms       Current Facility-Administered Medications   Medication Dose Route Frequency Provider Last Rate Last Admin    methylPREDNISolone acetate (DEPO-MEDROL) injection 40 mg  40 mg IntraMUSCular Once Gabi Ward, APRN         No Known Allergies    Health Maintenance   Topic Date Due    Colon cancer screen colonoscopy  Never done    Flu vaccine (1) 09/01/2021    DTaP/Tdap/Td vaccine (2 - Td or Tdap) 01/09/2025    Lipid screen  06/24/2026    COVID-19 Vaccine  Completed    Hepatitis C screen  Completed    HIV screen  Completed    Hepatitis A vaccine  Aged Out    Hepatitis B vaccine  Aged Out    Hib vaccine  Aged Out    Meningococcal (ACWY) vaccine  Aged Out    Pneumococcal 0-64 years Vaccine  Aged Out       Lab Results   Component Value Date    LABA1C 5.4 03/08/2021     No results found for: PSA, PSADIA  TSH   Date Value Ref Range Status   03/08/2021 2.960 0.270 - 4.200 uIU/mL Final ]  Lab Results   Component Value Date     03/08/2021    K 4.2 03/08/2021     03/08/2021    CO2 25 03/08/2021    BUN 10 03/08/2021    CREATININE 0.6 03/08/2021    GLUCOSE 91 06/24/2021    CALCIUM 9.0 03/08/2021    PROT 6.7 03/08/2021    LABALBU 4.0 03/08/2021    BILITOT <0.2 03/08/2021    ALKPHOS 72 03/08/2021    AST 12 03/08/2021    ALT 16 03/08/2021    LABGLOM >60 03/08/2021    GFRAA >59 03/08/2021     Lab Results   Component Value Date    CHOL 118 (L) 06/24/2021    CHOL 129 (L) 03/08/2021    CHOL 144 (L) 07/28/2020     Lab Results   Component Value Date    TRIG 103 06/24/2021    TRIG 199 (H) 03/08/2021    TRIG 135 08/26/2020     Lab Results   Component Value Date    HDL 37 (L) 06/24/2021    HDL 43 (L) 03/08/2021    HDL 40 (L) 07/28/2020     Lab Results   Component Value Date    LDLCALC 60 06/24/2021    LDLCALC 46 03/08/2021    LDLCALC 64 07/28/2020     Lab Results   Component Value Date     03/08/2021    K 4.2 03/08/2021     03/08/2021    CO2 25 03/08/2021    BUN 10 03/08/2021    CREATININE 0.6 03/08/2021    GLUCOSE 91 06/24/2021    CALCIUM 9.0 03/08/2021      Lab Results   Component Value Date    WBC 6.7 03/08/2021    HGB 13.9 03/08/2021    HCT 42.5 03/08/2021    MCV 81.9 03/08/2021     03/08/2021    LYMPHOPCT 31.3 03/08/2021    RBC 5.19 03/08/2021    MCH 26.8 (L) 03/08/2021    MCHC 32.7 (L) 03/08/2021    RDW 12.8 03/08/2021     Lab Results   Component Value Date    VITD25 22.8 (L) 03/08/2021       Subjective:      Review of Systems   Constitutional: Negative for fatigue, fever and unexpected weight change. HENT: Positive for congestion. Negative for ear discharge, ear pain, mouth sores, sore throat and trouble swallowing. Eyes: Negative for discharge, itching and visual disturbance. Respiratory: Positive for cough. Negative for choking, shortness of breath, wheezing and stridor. Cardiovascular: Negative for chest pain, palpitations and leg swelling.    Gastrointestinal: Negative for abdominal distention, abdominal pain, blood in stool, constipation, diarrhea, nausea and vomiting. Endocrine: Negative for cold intolerance, polydipsia and polyuria. Genitourinary: Negative for difficulty urinating, dysuria, frequency and urgency. Musculoskeletal: Negative for arthralgias and gait problem. Skin: Negative for color change and rash. Allergic/Immunologic: Negative for food allergies and immunocompromised state. Neurological: Negative for dizziness, tremors, syncope, speech difficulty, weakness and headaches. Hematological: Negative for adenopathy. Does not bruise/bleed easily. Psychiatric/Behavioral: Negative for confusion and hallucinations. The patient is nervous/anxious. Objective:     Physical Exam     Constitutional - well developed, well nourished. No diaphoresis or acute distress. Neck- ROM appears normal  Extremities -No cyanosis, no edema  Pulmonary - effort appears normal.  No respiratory distress. No accessory muscle use  Neurologic - alert and oriented X 3. Cranial Nerves II-XII grossly intact  Skin -color is good;  no sign of dehydration   Psychiatric - mood, affect, and behavior appear normal.  Judgment and thought processes appear normal.     There were no vitals taken for this visit. Vital signs were not taken at this visit as no equipment was available;      Assessment:      Problem List     Anxiety     We will start Paxil 10 mg daily at in addition to BuSpar 5 mg up to 3 times a day as needed until least the Paxil can kick in. Relevant Medications    traZODone (DESYREL) 50 MG tablet    PARoxetine (PAXIL) 10 MG tablet    busPIRone (BUSPAR) 5 MG tablet    COVID-19       she has not been very ill she has had some congestion                  Plan:        Patient given educational materials - see patient instructions. Discussed use, benefit, and side effects of prescribed medications. Allpatient questions answered.   Pt voiced

## 2022-02-15 ENCOUNTER — OFFICE VISIT (OUTPATIENT)
Dept: INTERNAL MEDICINE | Age: 47
End: 2022-02-15
Payer: MEDICAID

## 2022-02-15 VITALS
DIASTOLIC BLOOD PRESSURE: 80 MMHG | HEIGHT: 60 IN | TEMPERATURE: 98.4 F | OXYGEN SATURATION: 99 % | SYSTOLIC BLOOD PRESSURE: 120 MMHG | WEIGHT: 161 LBS | HEART RATE: 60 BPM | BODY MASS INDEX: 31.61 KG/M2

## 2022-02-15 DIAGNOSIS — R68.89 CONGESTION OF THROAT: ICD-10-CM

## 2022-02-15 DIAGNOSIS — J02.9 SORE THROAT: ICD-10-CM

## 2022-02-15 LAB
Lab: NORMAL
QC PASS/FAIL: NORMAL
S PYO AG THROAT QL: NORMAL
SARS-COV-2 RDRP RESP QL NAA+PROBE: NEGATIVE

## 2022-02-15 PROCEDURE — 99213 OFFICE O/P EST LOW 20 MIN: CPT | Performed by: NURSE PRACTITIONER

## 2022-02-15 PROCEDURE — 87635 SARS-COV-2 COVID-19 AMP PRB: CPT | Performed by: NURSE PRACTITIONER

## 2022-02-15 PROCEDURE — 87880 STREP A ASSAY W/OPTIC: CPT | Performed by: NURSE PRACTITIONER

## 2022-02-15 SDOH — ECONOMIC STABILITY: FOOD INSECURITY: WITHIN THE PAST 12 MONTHS, THE FOOD YOU BOUGHT JUST DIDN'T LAST AND YOU DIDN'T HAVE MONEY TO GET MORE.: NEVER TRUE

## 2022-02-15 SDOH — ECONOMIC STABILITY: FOOD INSECURITY: WITHIN THE PAST 12 MONTHS, YOU WORRIED THAT YOUR FOOD WOULD RUN OUT BEFORE YOU GOT MONEY TO BUY MORE.: NEVER TRUE

## 2022-02-15 ASSESSMENT — ENCOUNTER SYMPTOMS
BLOOD IN STOOL: 0
CHOKING: 0
VOMITING: 0
COUGH: 0
DIARRHEA: 0
ABDOMINAL PAIN: 0
TROUBLE SWALLOWING: 0
WHEEZING: 0
COLOR CHANGE: 0
EYE ITCHING: 0
STRIDOR: 0
EYE DISCHARGE: 0
NAUSEA: 0
SHORTNESS OF BREATH: 0
ABDOMINAL DISTENTION: 0
CONSTIPATION: 0
SORE THROAT: 1

## 2022-02-15 ASSESSMENT — PATIENT HEALTH QUESTIONNAIRE - PHQ9
9. THOUGHTS THAT YOU WOULD BE BETTER OFF DEAD, OR OF HURTING YOURSELF: 0
7. TROUBLE CONCENTRATING ON THINGS, SUCH AS READING THE NEWSPAPER OR WATCHING TELEVISION: 0
6. FEELING BAD ABOUT YOURSELF - OR THAT YOU ARE A FAILURE OR HAVE LET YOURSELF OR YOUR FAMILY DOWN: 0
SUM OF ALL RESPONSES TO PHQ QUESTIONS 1-9: 0
SUM OF ALL RESPONSES TO PHQ QUESTIONS 1-9: 0
1. LITTLE INTEREST OR PLEASURE IN DOING THINGS: 0
8. MOVING OR SPEAKING SO SLOWLY THAT OTHER PEOPLE COULD HAVE NOTICED. OR THE OPPOSITE, BEING SO FIGETY OR RESTLESS THAT YOU HAVE BEEN MOVING AROUND A LOT MORE THAN USUAL: 0
3. TROUBLE FALLING OR STAYING ASLEEP: 0
4. FEELING TIRED OR HAVING LITTLE ENERGY: 0
10. IF YOU CHECKED OFF ANY PROBLEMS, HOW DIFFICULT HAVE THESE PROBLEMS MADE IT FOR YOU TO DO YOUR WORK, TAKE CARE OF THINGS AT HOME, OR GET ALONG WITH OTHER PEOPLE: 0
SUM OF ALL RESPONSES TO PHQ QUESTIONS 1-9: 0
2. FEELING DOWN, DEPRESSED OR HOPELESS: 0
SUM OF ALL RESPONSES TO PHQ9 QUESTIONS 1 & 2: 0
SUM OF ALL RESPONSES TO PHQ QUESTIONS 1-9: 0
5. POOR APPETITE OR OVEREATING: 0

## 2022-02-15 ASSESSMENT — SOCIAL DETERMINANTS OF HEALTH (SDOH): HOW HARD IS IT FOR YOU TO PAY FOR THE VERY BASICS LIKE FOOD, HOUSING, MEDICAL CARE, AND HEATING?: NOT HARD AT ALL

## 2022-02-15 NOTE — PATIENT INSTRUCTIONS
1.  Sore throat  2.   Congestion   Salt water gargle several times a day  Alternate ibuprofen Tylenol for pain and fever  Mucinex 1200 twice a day with lots of water to help get any congestion

## 2022-02-15 NOTE — PROGRESS NOTES
[facility]  [facility]  [address] [address]  [facility] [address] [address]  Dept: [phone]  Dept Fax: [phone] 33: 71 Manuel Clayton (:  1975) is a 49 y.o. female,Established patient  with green, here for evaluation of the following chief complaint(s): Cough and Pharyngitis (started yesterday )      Ana Lee is a 49 y.o. female who presents today for her medical conditions/complaints as noted below. Ana Lee is c/rigoberto Cough and Pharyngitis (started yesterday )        HPI:     Chief Complaint   Patient presents with    Cough    Pharyngitis     started yesterday      HPI   1. Sore throat since yesterday;   Today some head congesiont    No fever       Past Medical History:   Diagnosis Date    Acute on chronic diastolic congestive heart failure ([facility] [address] 10/16/2019    Anxiety     Anxiety and depression     Class 1 obesity in adult 2019    Shoulder fracture, right 2007    Stress incontinence 3/3/2021      Past Surgical History:   Procedure Laterality Date    HYSTERECTOMY         Vitals 2/15/2022 3/3/2021 2021 2020 9/10/2019 1/3/8715   SYSTOLIC 331 778 - 377 619 486   DIASTOLIC 80 74 - 72 72 68   Pulse 60 60 64 60 65 82   Temp 98.4 - 96.6 - - -   Resp - - - - 18 18   SpO2 99 - 99 - 97 98   Weight 161 lb 168 lb - 174 lb 160 lb 155 lb   Height 5' 0\" 5' 0\" - 5' 0\" 5' 0\" 5' 0\"   Body mass index 31.44 kg/m2 32.81 kg/m2 - 33.98 kg/m2 31.24 kg/m2 30.27 kg/m2   Waist (Inches) - - - - - -   Some recent data might be hidden       Family History   Problem Relation Age of Onset    Diabetes Father     Cancer Maternal Aunt     Diabetes Paternal Grandmother        Social History     Tobacco Use    Smoking status: Former Smoker     Packs/day: 0.25     Quit date: 2018     Years since quittin.0    Smokeless tobacco: Never Used   Substance Use Topics    Alcohol use: Yes     Comment: social       Current Outpatient Medications Medication Sig Dispense Refill    traZODone (DESYREL) 50 MG tablet TAKE 1 TABLET BY MOUTH EVERY EVENING 90 tablet 3    vitamin D (ERGOCALCIFEROL) 1.25 MG (56627 UT) CAPS capsule Take 1 capsule by mouth once a week 30 capsule 3    PARoxetine (PAXIL) 10 MG tablet Take 1 tablet by mouth daily (Patient not taking: Reported on 2/15/2022) 30 tablet 3    cyclobenzaprine (FLEXERIL) 10 MG tablet Take 10 mg by mouth 3 times daily as needed for Muscle spasms (Patient not taking: Reported on 2/15/2022)       Current Facility-Administered Medications   Medication Dose Route Frequency Provider Last Rate Last Admin    methylPREDNISolone acetate (DEPO-MEDROL) injection 40 mg  40 mg IntraMUSCular Once Hunter Ward, APRN         No Known Allergies    Health Maintenance   Topic Date Due    Depression Monitoring  Never done    Colon cancer screen colonoscopy  Never done    COVID-19 Vaccine (3 - Booster for Pfizer series) 07/12/2021    Flu vaccine (1) 09/01/2021    DTaP/Tdap/Td vaccine (2 - Td or Tdap) 01/09/2025    Lipid screen  06/24/2026    Hepatitis C screen  Completed    HIV screen  Completed    Hepatitis A vaccine  Aged Out    Hepatitis B vaccine  Aged Out    Hib vaccine  Aged Out    Meningococcal (ACWY) vaccine  Aged Out    Pneumococcal 0-64 years Vaccine  Aged Out       Lab Results   Component Value Date    LABA1C 5.4 03/08/2021     No results found for: PSA, PSADIA  TSH   Date Value Ref Range Status   03/08/2021 2.960 0.270 - 4.200 uIU/mL Final   ]  Lab Results   Component Value Date     03/08/2021    K 4.2 03/08/2021     03/08/2021    CO2 25 03/08/2021    BUN 10 03/08/2021    CREATININE 0.6 03/08/2021    GLUCOSE 91 06/24/2021    CALCIUM 9.0 03/08/2021    PROT 6.7 03/08/2021    LABALBU 4.0 03/08/2021    BILITOT <0.2 03/08/2021    ALKPHOS 72 03/08/2021    AST 12 03/08/2021    ALT 16 03/08/2021    LABGLOM >60 03/08/2021    GFRAA >59 03/08/2021     Lab Results   Component Value Date    CHOL 118 (L) 06/24/2021    CHOL 129 (L) 03/08/2021    CHOL 144 (L) 07/28/2020     Lab Results   Component Value Date    TRIG 103 06/24/2021    TRIG 199 (H) 03/08/2021    TRIG 135 08/26/2020     Lab Results   Component Value Date    HDL 37 (L) 06/24/2021    HDL 43 (L) 03/08/2021    HDL 40 (L) 07/28/2020     Lab Results   Component Value Date    LDLCALC 60 06/24/2021    LDLCALC 46 03/08/2021    LDLCALC 64 07/28/2020     Lab Results   Component Value Date     03/08/2021    K 4.2 03/08/2021     03/08/2021    CO2 25 03/08/2021    BUN 10 03/08/2021    CREATININE 0.6 03/08/2021    GLUCOSE 91 06/24/2021    CALCIUM 9.0 03/08/2021      Lab Results   Component Value Date    WBC 6.7 03/08/2021    HGB 13.9 03/08/2021    HCT 42.5 03/08/2021    MCV 81.9 03/08/2021     03/08/2021    LYMPHOPCT 31.3 03/08/2021    RBC 5.19 03/08/2021    MCH 26.8 (L) 03/08/2021    MCHC 32.7 (L) 03/08/2021    RDW 12.8 03/08/2021     Lab Results   Component Value Date    VITD25 22.8 (L) 03/08/2021     Labs reviewed from today   Diagnostics reviewed from today  Subjective:      Review of Systems   Constitutional: Negative for fatigue, fever and unexpected weight change. HENT: Positive for congestion and sore throat. Negative for ear discharge, ear pain, mouth sores and trouble swallowing. Eyes: Negative for discharge, itching and visual disturbance. Respiratory: Negative for cough, choking, shortness of breath, wheezing and stridor. Cardiovascular: Negative for chest pain, palpitations and leg swelling. Gastrointestinal: Negative for abdominal distention, abdominal pain, blood in stool, constipation, diarrhea, nausea and vomiting. Endocrine: Negative for cold intolerance, polydipsia and polyuria. Genitourinary: Negative for difficulty urinating, dysuria, frequency and urgency. Musculoskeletal: Negative for arthralgias and gait problem. Skin: Negative for color change and rash.    Allergic/Immunologic: Negative for food allergies and immunocompromised state. Neurological: Negative for dizziness, tremors, syncope, speech difficulty, weakness and headaches. Hematological: Negative for adenopathy. Does not bruise/bleed easily. Psychiatric/Behavioral: Negative for confusion and hallucinations. Objective:     Physical Exam  Constitutional:       General: She is not in acute distress. Appearance: She is well-developed. HENT:      Head: Normocephalic and atraumatic. Mouth/Throat:      Pharynx: Posterior oropharyngeal erythema present. No oropharyngeal exudate. Eyes:      General: No scleral icterus. Right eye: No discharge. Left eye: No discharge. Pupils: Pupils are equal, round, and reactive to light. Neck:      Thyroid: No thyromegaly. Vascular: No JVD. Cardiovascular:      Rate and Rhythm: Normal rate and regular rhythm. Heart sounds: Normal heart sounds. No murmur heard. Pulmonary:      Effort: Pulmonary effort is normal. No respiratory distress. Breath sounds: Normal breath sounds. No wheezing or rales. Abdominal:      General: Bowel sounds are normal. There is no distension. Palpations: Abdomen is soft. There is no mass. Tenderness: There is no abdominal tenderness. There is no guarding or rebound. Musculoskeletal:         General: No tenderness. Normal range of motion. Cervical back: Normal range of motion and neck supple. Skin:     General: Skin is warm and dry. Findings: No erythema or rash. Neurological:      Mental Status: She is alert and oriented to person, place, and time. Cranial Nerves: No cranial nerve deficit. Coordination: Coordination normal.      Deep Tendon Reflexes: Reflexes are normal and symmetric. Reflexes normal.   Psychiatric:         Mood and Affect: Mood is not depressed. Behavior: Behavior normal.         Thought Content:  Thought content normal.         Judgment: Judgment normal.       /80   Pulse 60   Temp 98.4 °F (36.9 °C)   Ht 5' (1.524 m)   Wt 161 lb (73 kg)   SpO2 99%   BMI 31.44 kg/m²           Assessment:      Problem List     Congestion of throat     covid and strept swab          Relevant Orders    POCT COVID-19 Rapid, NAAT (Completed)    POCT rapid strep A (Completed)    Sore throat     covid and strept swab          Relevant Orders    POCT COVID-19 Rapid, NAAT (Completed)    POCT rapid strep A (Completed)          Plan:        Patient given educational materials - see patient instructions. Discussed use, benefit, and side effects of prescribed medications. Allpatient questions answered. Pt voiced understanding. Reviewed health maintenance. Instructed to continue current medications, diet and exercise. Patient agreed with treatment plan. Follow up as directed. MEDICATIONS:  No orders of the defined types were placed in this encounter. ORDERS:  Orders Placed This Encounter   Procedures    POCT COVID-19 Rapid, NAAT    POCT rapid strep A       Follow-up:  Return for keep fu appt, have labs done prior to appt. PATIENT INSTRUCTIONS:  Patient Instructions   1. Sore throat  2. Congestion   Salt water gargle several times a day  Alternate ibuprofen Tylenol for pain and fever  Mucinex 1200 twice a day with lots of water to help get any congestion    Electronically signed by FCO Hassan on 2/15/2022 at 2:00 PM    @On this date 2/15/2022 I have spent 15  minutes reviewing previous notes, test results and face to face with the patient discussing the diagnosis and importance of compliance with the treatment plan as well as documenting on the day of the visit. EMR[employer]agon/transcription disclaimer:  Much of this encounter note is electronic transcription/translation of spoken language to printed texts. The electronic translation of spoken language may be erroneous, or at times,nonsensical words or phrases may be inadvertently transcribed.   Although I have reviewed the note for such errors, some may still exist.

## 2022-03-17 PROBLEM — J02.9 SORE THROAT: Status: RESOLVED | Noted: 2022-02-15 | Resolved: 2022-03-17

## 2024-07-16 ENCOUNTER — TRANSCRIBE ORDERS (OUTPATIENT)
Dept: ADMINISTRATIVE | Age: 49
End: 2024-07-16

## 2024-07-16 DIAGNOSIS — Z12.31 ENCOUNTER FOR SCREENING MAMMOGRAM FOR MALIGNANT NEOPLASM OF BREAST: Primary | ICD-10-CM
